# Patient Record
Sex: FEMALE | Race: BLACK OR AFRICAN AMERICAN
[De-identification: names, ages, dates, MRNs, and addresses within clinical notes are randomized per-mention and may not be internally consistent; named-entity substitution may affect disease eponyms.]

---

## 2017-06-19 NOTE — ER DOCUMENT REPORT
HPI





- HPI


Patient complains to provider of: low back pain and medial left ankle pain


Onset: Other - saturday


Onset/Duration: Sudden


Quality of pain: Achy


Pain Level: 4


Context: 


21-year-old female insulin-dependent diabetic slipped on some stairs on her 

back and hitting her medial left ankle on some metal on the way down.  She is 

complaining of pain to her low back and medial ankle and wanting x-rays.  

Noticed when talking with the patient that she smelled ketotic.  She states she 

was discharged from [facility] on Saturday for critical DKA.  She felt 

well when she was discharged Saturday and she still feels well today.  Her 

glucose was 220 this morning which she covered with insulin.  She has not eaten 

today.  She is drinking water.





- REPRODUCTIVE


Reproductive: DENIES: Pregnant:





- DERM


Skin Color: Normal





Past Medical History





- General


Information source: Patient





- Social History


Smoking Status: Unknown if Ever Smoked


Frequency of alcohol use: None


Drug Abuse: None


Lives with: Parents


Family History: Reviewed & Not Pertinent


Patient has suicidal ideation: No


Patient has homicidal ideation: No


Endocrine Medical History: Reports: Hx Diabetes Mellitus Type 1


Renal/ Medical History: Denies: Hx Peritoneal Dialysis


Past Surgical History: Reports: Hx Cholecystectomy





- Immunizations


Hx Diphtheria, Pertussis, Tetanus Vaccination: Yes





Vertical Provider Document





- CONSTITUTIONAL


Agree With Documented VS: Yes





- INFECTION CONTROL


TRAVEL OUTSIDE OF THE [place] IN LAST 30 DAYS: No





- HEENT


HEENT: Atraumatic, Normocephalic


Notes: 


smells ketotic, looks dry





- RESPIRATORY


O2 Sat by Pulse Oximetry: 99





- NEURO


Level of Consciousness: Awake, Alert, Appropriate


Motor/Sensory: No Motor Deficit, No Sensory Deficit





- DERM


Notes: 


nurse was able to remove the gray discoloration with body wipe. Pt was asking 

for something for pain stronger than tylenol , I checked her in the [place] 

controlled substance database and she is not listed for any narcotic 

prescriptions





Course





- Re-evaluation


Re-evalutation: 


06/19/17 10:24


X-rays are negative.  Explained the risks to the patient earlier about not 

addressing her acidotic odor to her breath.  She did not want to be evaluated 

for her diabetes or acidotic odor.  She states since then she feels the same as 

she did when she was discharged from the [facility] on Saturday that she 

will continue to drink fluids and check her glucoses today.  Signed the refusal 

of treatment form and Dr. Blank was okay with that.














- Vital Signs


Vital signs: 


 











Temp Pulse Resp BP Pulse Ox


 


 98.1 F   116 H  14   122/89 H  99 


 


 06/19/17 07:25  06/19/17 07:25  06/19/17 07:25  06/19/17 07:25  06/19/17 07:25














Procedures





- Immobilization


  ** Left Ankle


Time completed: 10:35


Pre-Proc Neuro Vasc Exam: Normal


Immobilizer type: Ace wrap


Performed by: PCT


Post-Proc Neuro Vasc Exam: Normal


Alignment checked and good: Yes





Discharge





- Discharge


Clinical Impression: 


 refusal of evaluation of diabetes





Fall


Qualifiers:


 Encounter type: initial encounter Qualified Code(s): W19.XXXA - Unspecified 

fall, initial encounter





Low back pain


Qualifiers:


 Chronicity: acute Back pain laterality: bilateral Sciatica presence: without 

sciatica Qualified Code(s): M54.5 - Low back pain





Left ankle pain


Qualifiers:


 Chronicity: acute Qualified Code(s): M25.572 - Pain in left ankle and joints 

of left foot





Condition: Good


Disposition: HOME, SELF-CARE


Instructions:  Ace Wrap ([facility]), Sprained Ankle ([facility]), Low Back Pain ([facility])


Additional Instructions: 


return to the er if you change your mind about checking your diabetes status


drink  water all day long, to flush your system


Ace wrap for comfort





Please complete the patient satisfaction survey if you get one, and return it.. 

If you do not receive a survey,  then you can go to the [facility] website, [email or web address] 

and place your comments about your very good care. Thank you very much. It was 

a pleasure being your medical provider today.


Prescriptions: 


Hydrocodone Bit/Acetaminophen [Hydrocodon-Acetaminophen 5-325] 1 each PO Q4HP 

PRN #7 tablet


 PRN Reason:

## 2017-06-19 NOTE — RADIOLOGY REPORT (SQ)
EXAM DESCRIPTION:  L SPINE WHOLE



COMPLETED DATE/TIME:  6/19/2017 10:06 am



REASON FOR STUDY:  fall, bruis, pain



COMPARISON:  None.



NUMBER OF VIEWS:  Five views including obliques.



TECHNIQUE:  AP, lateral, oblique, and sacral radiographic images acquired of the lumbar spine.



LIMITATIONS:  None.



FINDINGS:  MINERALIZATION: Normal.

SEGMENTATION: Normal.  No transitional anatomy.

ALIGNMENT: Normal.

VERTEBRAE: Maintained height.  No fracture or worrisome bone lesion.

DISCS: Preserved height.  No significant osteophytes or end plate irregularity.

POSTERIOR ELEMENTS: Pedicles and facets are intact.  No pars defect or posterior arch defects.

HARDWARE: None in the spine.

PARASPINAL SOFT TISSUES: Normal.

PELVIS: Intact as visualized. No fractures or worrisome bone lesions. SI joints intact.

OTHER: No other significant finding.



IMPRESSION:  NORMAL 5 VIEW LUMBAR SPINE.



TECHNICAL DOCUMENTATION:  JOB ID:  [id number]

 2011 [employer]- All Rights Reserved

## 2017-06-19 NOTE — RADIOLOGY REPORT (SQ)
EXAM DESCRIPTION:  ANKLE LEFT COMPLETE



COMPLETED DATE/TIME:  6/19/2017 10:06 am



REASON FOR STUDY:  fall, bruis, pain



COMPARISON:  11/5/2016.



NUMBER OF VIEWS:  Three views.



TECHNIQUE:  AP, lateral, and oblique radiographic images acquired of the left ankle.



LIMITATIONS:  None.



FINDINGS:  MINERALIZATION: Normal.

BONES: No acute fracture or dislocation.  No worrisome bone lesions.

JOINTS: No effusions.

SOFT TISSUES: No soft tissue swelling. No foreign body.

OTHER: No other significant finding.



IMPRESSION:  NEGATIVE STUDY OF THE LEFT ANKLE. NO RADIOGRAPHIC EVIDENCE OF ACUTE INJURY.



TECHNICAL DOCUMENTATION:  JOB ID:  [id number]

 2011 [employer]- All Rights Reserved

## 2017-06-22 NOTE — ER DOCUMENT REPORT
ED Medical Screen (RME)





- General


Chief Complaint: Fall Injury


Stated Complaint: SHORTNESS OF BREATH


Time Seen by Provider: 06/22/17 18:04


Notes: 


Patient is a known insulin-dependent diabetic who is here complaining of 

feeling short of breath for the past 2 days.  Her blood sugars at home have 

been running 300 or more.  She was seen here about 3 or 4 days ago with ankle 

and back pain after a fall.


TRAVEL OUTSIDE OF THE [place] IN LAST 30 DAYS: No





- Related Data


Allergies/Adverse Reactions: 


 





adhesive tape [Adhesive Tape] Allergy (Verified 06/19/17 07:25)


 











Past Medical History


Endocrine Medical History: Reports: Hx Diabetes Mellitus Type 1


Renal/ Medical History: Denies: Hx Peritoneal Dialysis


Past Surgical History: Reports: Hx Cholecystectomy





- Immunizations


Hx Diphtheria, Pertussis, Tetanus Vaccination: Yes





Physical Exam





- Vital signs


Vitals: 





 











Temp Pulse Resp BP Pulse Ox


 


 98.2 F   134 H  20   129/93 H  100 


 


 06/22/17 17:50  06/22/17 17:50  06/22/17 17:50  06/22/17 17:50  06/22/17 17:50














Course





- Vital Signs


Vital signs: 





 











Temp Pulse Resp BP Pulse Ox


 


 98.2 F   134 H  20   129/93 H  100 


 


 06/22/17 17:50  06/22/17 17:50  06/22/17 17:50  06/22/17 17:50  06/22/17 17:50

## 2017-06-22 NOTE — ER DOCUMENT REPORT
ED General





- General


Chief Complaint: Fall Injury


Stated Complaint: SHORTNESS OF BREATH


Time Seen by Provider: 06/22/17 18:04


Mode of Arrival: Ambulatory


Information source: Patient


Notes: 


21-year-old diabetic female presents with complaints of left ankle pain low 

back pain after fall a few days prior.  Patient presents with a blood sugar in 

the 300s denies any fevers or chills admits to mild nausea.  Patient last in 

DKA over one year ago


TRAVEL OUTSIDE OF THE [place] IN LAST 30 DAYS: No





- HPI


Onset: Just prior to arrival


Onset/Duration: Sudden


Quality of pain: Achy


Severity: Mild


Pain Level: 1


Associated symptoms: Weakness


Exacerbated by: Denies


Relieved by: Denies


Similar symptoms previously: Yes


Recently seen / treated by doctor: Yes





- Related Data


Allergies/Adverse Reactions: 


 





adhesive tape [Adhesive Tape] Allergy (Verified 06/19/17 07:25)


 











Past Medical History





- Social History


Smoking Status: Never Smoker


Cigarette use (# per day): No


Chew tobacco use (# tins/day): No


Smoking Education Provided: No


Family History: Reviewed & Not Pertinent


Patient has suicidal ideation: No


Patient has homicidal ideation: No


Endocrine Medical History: Reports: Hx Diabetes Mellitus Type 1


Renal/ Medical History: Denies: Hx Peritoneal Dialysis


Past Surgical History: Reports: Hx Cholecystectomy





- Immunizations


Hx Diphtheria, Pertussis, Tetanus Vaccination: Yes





Review of Systems





- Review of Systems


Notes: 


REVIEW OF SYSTEMS:


CONSTITUTIONAL :  Denies fever,  chills, or sweats.  Denies recent illness.


EENT:   Denies eye, ear, throat, or mouth pain or symptoms.  Denies nasal or 

sinus congestion or discharge.  Denies throat, tongue, or mouth swelling or 

difficulty swallowing.


CARDIOVASCULAR:  Denies chest pain.  Denies palpitations or racing or irregular 

heart beat.  Denies ankle edema.


RESPIRATORY:  Denies cough, cold, or chest congestion.  Denies shortness of 

breath, difficulty breathing, or wheezing.


GASTROINTESTINAL: Admits to nausea


GENITOURINARY:  Denies difficulty urinating, painful urination, burning, 

frequency, blood in urine, or discharge.


FEMALE  GENITOURINARY:  Denies vaginal bleeding, heavy or abnormal periods, 

irregular periods.  Denies vaginal discharge or odor. 


MUSCULOSKELETAL: Admits to back pain ankle pain


SKIN:   Denies rash, lesions or sores.


HEMATOLOGIC :   Denies easy bruising or bleeding.


LYMPHATIC:  Denies swollen, enlarged glands.


NEUROLOGICAL:  Denies confusion or altered mental status.  Denies passing out 

or loss of consciousness.  Denies dizziness or lightheadedness.  Denies 

headache.  Denies weakness or paralysis or loss of use of either side.  Denies 

problems with gait or speech.  Denies sensory loss, numbness, or tingling.  

Denies seizures.


PSYCHIATRIC:  Denies anxiety or stress.  Denies depression, suicidal ideation, 

or homicidal ideation.





ALL OTHER SYSTEMS REVIEWED AND NEGATIVE.











PHYSICAL EXAMINATION:





GENERAL: ill appearing female , drowsy





HEAD: Atraumatic, normocephalic.





EYES: Pupils equal round and reactive to light, extraocular movements intact, 

conjunctiva are normal.





ENT: Nares patent, oropharynx clear without exudates.  Moist mucous membranes.





NECK: Normal range of motion, supple without lymphadenopathy





LUNGS: Breath sounds clear to auscultation bilaterally and equal.  No wheezes 

rales or rhonchi.





HEART: Regular rate and rhythm without murmurs





ABDOMEN: Soft, nontender, nondistended abdomen.  No guarding, no rebound.  No 

masses appreciated.





Female : deferred





Musculoskeletal: Normal range of motion, no pitting or edema.  No cyanosis.





NEUROLOGICAL: drowsy but gcs 15





PSYCH: Normal mood, normal affect.





SKIN: Warm, Dry, normal turgor, no rashes or lesions noted.

















Dictation was performed using Dragon voice recognition software





Physical Exam





- Vital signs


Vitals: 


 











Temp Pulse Resp BP Pulse Ox


 


 98.2 F   134 H  20   129/93 H  100 


 


 06/22/17 17:50  06/22/17 17:50  06/22/17 17:50  06/22/17 17:50  06/22/17 17:50














Course





- Re-evaluation


Re-evalutation: 





06/22/17 23:48


Patient initially tachycardic tachypneic hyperventilating, glucose was slightly 

elevated however with further evaluation it does appear the patient is in 

diabetic ketoacidosis.  I will admit the patient to the ICU has started drip of 

insulin at 8 units as well as D5 half-normal saline given the anion gap





- Vital Signs


Vital signs: 


 











Temp Pulse Resp BP Pulse Ox


 


 98.2 F   134 H  20   129/93 H  100 


 


 06/22/17 17:50  06/22/17 17:50  06/22/17 17:50  06/22/17 17:50  06/22/17 17:50














- Laboratory


Result Diagrams: 


 06/22/17 18:24





 06/22/17 19:26


Laboratory results interpreted by me: 


 











  06/22/17 06/22/17 06/22/17





  18:24 18:24 19:26


 


MCHC  31.4 L  


 


RDW  16.0 H  


 


VBG pH   


 


VBG pCO2   


 


VBG HCO3   


 


Sodium    135.9 L


 


Potassium    5.1 H


 


Carbon Dioxide    5 L*


 


Anion Gap    27 H


 


Glucose    316 H


 


Direct Bilirubin    0.5 H


 


Total Protein    8.5 H


 


Urine Glucose (UA)   >=500 H 


 


Urine Ketones   80 H 














  06/22/17





  21:54


 


MCHC 


 


RDW 


 


VBG pH  7.12 L*


 


VBG pCO2  22.6 L


 


VBG HCO3  7.2 L


 


Sodium 


 


Potassium 


 


Carbon Dioxide 


 


Anion Gap 


 


Glucose 


 


Direct Bilirubin 


 


Total Protein 


 


Urine Glucose (UA) 


 


Urine Ketones 














Critical Care Note





- Critical Care Note


Total time excluding time spent on procedures (mins): 40


Comments: 


40 minutes of critical care time spent in direct contact evaluating and 

reevaluating the patient, treating symptoms, reviewing labs and studies and 

speaking with family  and consultants excluding any procedures





Discharge





- Discharge


Clinical Impression: 


DKA (diabetic ketoacidoses)


Qualifiers:


 Diabetes mellitus type: type 1 Diabetes mellitus complication detail: without 

coma Qualified Code(s): E10.10 - Type 1 diabetes mellitus with ketoacidosis 

without coma





Fall


Qualifiers:


 Encounter type: initial encounter Qualified Code(s): W19.XXXA - Unspecified 

fall, initial encounter





Condition: Fair


Disposition: ADMITTED AS INPATIENT


Admitting Provider: Hospitalist


Unit Admitted: ICU

## 2017-06-23 NOTE — PDOC H&P
History of Present Illness


Admission Date/PCP: 


  06/22/17 22:34





  





Patient complains of: Abdominal pain and nausea


History of Present Illness: 


AMY PACHECO is a 21 year old female with a past medical history of 

Hashimoto's thyroiditis, postpartum cardiomyopathy, Depression, Anxiety and 

insulin dependent diabetes with recurrent DKA.  She comes to the emergency room 

after uncontrolled hyperglycemia and fall without subsequent injury where she 

is found to have severe DKA with a bicarb of 5.  She denies headache sore 

throat shortness of breath or cough, admits to polyuria and polydipsia and 

blood sugars between 200 and 400.  She admits several episodes in the recent 

past requiring hospitalization at outside facilities.  She lives in [place] [facility] and is in [place] visiting her boyfriend.








Past Medical History


Medical History: Other - Noncompliance


Cardiac Medical History: Reports: Heart Murmur, Other - Postpartum 

cardiomyopathy, borderline QT interval prolongation


Endocrine Medical History: Reports: Diabetes Mellitus Type 1


Psychiatric Medical History: Reports: Depression, General Anxiety Disorder





Past Surgical History


Past Surgical History: Reports: Cholecystectomy





Social History


Information Source: Patient, [facility] Records


Lives with: Family


Smoking Status: Unknown if Ever Smoked


Frequency of Alcohol Use: None


Hx Recreational Drug Use: No


Drugs: None


Hx Prescription Drug Abuse: No





- Advance Directive


Resuscitation Status: Full Code





Family History


Family History: DM, Hypertension


Parental Family History Reviewed: Yes


Children Family History Reviewed: Yes


Sibling(s) Family History Reviewed.: Yes





Medication/Allergy


Home Medications: 








Insulin Lispro [Humalog Insulin (Lispro) 100 unit/mL] 0 unit SUBCUT .SLD SCALE 

05/01/16 


Insulin Glargine,Hum.rec.anlog [Lantus Insulin 100 Unit/mL] 32 unit SUBCUT 

DAILY  insuln.pen 05/02/16 


Hydrocodone/Acetaminophen [Norco 5-325 mg Tablet] 1 tab PO Q6HP PRN #25 tablet 

05/18/16 


Sulfamethoxazole/Trimethoprim [Bactrim Ds Tablet] 1 each PO BID #14 tablet 05/18 /16 


Diphenhydramine HCl [Benadryl 50 mg Capsule] 50 mg PO Q6 #20 capsule 06/07/16 


Promethazine HCl [Phenergan 25 mg Tablet] 25 - 50 mg PO ASDIR PRN #12 tablet 06/ 07/16 


Sulfamethoxazole/Trimethoprim [Bactrim Ds Tablet] 1 each PO BID #10 tablet 06/11 /16 


Hydrocodone/Acetaminophen [Norco 5-325 Tablet] 1 each PO Q6 PRN #15 tablet 11/05 /16 


Hydrocodone Bit/Acetaminophen [Hydrocodon-Acetaminophen 5-325] 1 each PO Q4HP 

PRN #7 tablet 06/19/17 








Allergies/Adverse Reactions: 


 





adhesive tape [Adhesive Tape] Allergy (Verified 06/19/17 07:25)


 











Review of Systems


Constitutional: ABSENT: chills, fever(s), headache(s), weight gain, weight loss


Eyes: ABSENT: visual disturbances


Ears: ABSENT: hearing changes


Cardiovascular: ABSENT: chest pain, dyspnea on exertion, edema, orthropnea, 

palpitations


Respiratory: ABSENT: cough, hemoptysis


Gastrointestinal: ABSENT: abdominal pain, constipation, diarrhea, hematemesis, 

hematochezia, nausea, vomiting


Genitourinary: ABSENT: dysuria, hematuria


Musculoskeletal: ABSENT: joint swelling


Integumentary: ABSENT: rash, wounds


Neurological: ABSENT: abnormal gait, abnormal speech, confusion, dizziness, 

focal weakness, syncope


Psychiatric: ABSENT: anxiety, depression, homidical ideation, suicidal ideation


Endocrine: ABSENT: cold intolerance, heat intolerance, polydipsia, polyuria


Hematologic/Lymphatic: ABSENT: easy bleeding, easy bruising





Physical Exam


Vital Signs: 


 











Temp Pulse Resp BP Pulse Ox


 


 98 F   122 H  15   110/86 H  98 


 


 06/23/17 01:58  06/23/17 01:58  06/23/17 02:00  06/23/17 01:54  06/23/17 02:00








 Intake & Output











 06/21/17 06/22/17 06/23/17





 11:59 11:59 11:59


 


Weight   53 kg











General appearance: PRESENT: disheveled, mild distress, thin


Head exam: PRESENT: atraumatic, normocephalic


Eye exam: PRESENT: conjunctiva pink, EOMI, PERRLA.  ABSENT: scleral icterus


Ear exam: PRESENT: normal external ear exam


Mouth exam: PRESENT: dry mucosa


Neck exam: ABSENT: carotid bruit, JVD, lymphadenopathy, thyromegaly


Respiratory exam: PRESENT: clear to auscultation jessika.  ABSENT: rales, rhonchi, 

wheezes


Cardiovascular exam: PRESENT: gallop, RRR, systolic murmur, tachycardia.  ABSENT

: diastolic murmur, rubs


Pulses: PRESENT: normal dorsalis pedis pul


GI/Abdominal exam: PRESENT: normal bowel sounds, soft.  ABSENT: distended, 

guarding, mass, organolmegaly, rebound, tenderness


Rectal exam: PRESENT: deferred


Extremities exam: PRESENT: full ROM.  ABSENT: calf tenderness, clubbing, pedal 

edema


Neurological exam: PRESENT: alert, awake, oriented to person, oriented to place

, oriented to time, oriented to situation, CN II-XII grossly intact.  ABSENT: 

motor sensory deficit


Psychiatric exam: PRESENT: anxious, depressed, manic, unusual affect.  ABSENT: 

suicidal ideation


Focused psych exam: PRESENT: internal stimuli, restlessness


Skin exam: PRESENT: dry, intact, warm.  ABSENT: cyanosis, rash





Results


Laboratory Results: 


 





 06/23/17 03:23 





 06/23/17 03:23 





 











  06/22/17 06/23/17 06/23/17





  23:13 03:23 03:23


 


WBC    5.3


 


RBC    3.81


 


Hgb    10.9 L


 


Hct    33.8 L


 


MCV    89


 


MCH    28.6


 


MCHC    32.2


 


RDW    15.6 H


 


Plt Count    340


 


Seg Neutrophils %    45.9


 


Lymphocytes %    39.5


 


Monocytes %    12.8


 


Eosinophils %    1.3


 


Basophils %    0.5


 


Absolute Neutrophils    2.4


 


Absolute Lymphocytes    2.1


 


Absolute Monocytes    0.7


 


Absolute Eosinophils    0.1


 


Absolute Basophils    0.0


 


Sodium  137.0  137.7 


 


Potassium  5.1 H  3.0 L* D 


 


Chloride  107  112 H 


 


Carbon Dioxide  6 L*  14 L 


 


Anion Gap  24 H  12 


 


BUN  7  6 L 


 


Creatinine  0.52  0.40 L 


 


Est GFR ( Amer)  > 60  > 60 


 


Est GFR (Non-Af Amer)  > 60  > 60 


 


Glucose  264 H  114 H 


 


Calcium  8.9  8.7 














Assessment & Plan





- Diagnosis


(1) DKA (diabetic ketoacidoses)


Qualifiers: 


     Diabetes mellitus type: type 1     Diabetes mellitus complication detail: 

without coma     Qualified Code(s): E10.10 - Type 1 diabetes mellitus with 

ketoacidosis without coma  


Is this a current diagnosis for this admission?: YesPlan: 


Diabetic ketoacidosis  patient has had some degree of polyuria polydipsia with 

nausea and uncontrolled hyperglycemia with supporting labs.  Patient will 

receive IV fluids IV insulin serial chemistries every 6 hours for evaluation 

for electrolyte repletion.  Continued evaluation for underlying cause if not 

found Patient will require diabetic education and consideration of mental 

health evaluation.








(2) Noncompliance


Is this a current diagnosis for this admission?: YesPlan: 


Mental health consult








(3) Anxiety


Is this a current diagnosis for this admission?: YesPlan: 


Benzodiazepine as needed tox screen negative








(4) Depression





Is this a current diagnosis for this admission?: YesPlan: 


Trial of Abilify initiated











- Time


Time Spent: 50 to 70 Minutes





- Inpatient Certification


Medical Necessity: Need Close Monitoring Due to Risk of Patient Decompensation

## 2017-06-23 NOTE — EKG REPORT
SEVERITY:- BORDERLINE ECG -

SINUS TACHYCARDIA

BORDERLINE LEFT AXIS DEVIATION

BORDERLINE PROLONGED QT INTERVAL

:

Confirmed by: Estrella Amanda MD 23-Jun-2017 04:00:54

## 2017-06-23 NOTE — PDOC PROGRESS REPORT
Subjective


Progress Note for:: 06/23/17


Subjective:: 


Patient complains of wanting to eat and of chronic ankle and back discomfort.


Denies chest pain, breath, fever, chills, dysuria.





Physical Exam


Vital Signs: 


 











Temp Pulse Resp BP Pulse Ox


 


 98.3 F   111 H  15   108/78   100 


 


 06/23/17 12:00  06/23/17 14:00  06/23/17 12:00  06/23/17 12:00  06/23/17 13:00








 Intake & Output











 06/22/17 06/23/17 06/24/17





 06:59 06:59 06:59


 


Intake Total  836 1737


 


Output Total   250


 


Balance  836 1487


 


Weight  49.1 kg 











Exam: 


GENERAL: No acute distress


HEENT: Conjunctiva clear, nonicteric, moist mucous membranes, no JVD, midline 

trachea


RESPIRATORY: Clear to auscultation bilaterally, no wheezes, no rhonchi


CARDIAC: Regular rate and rhythm, no murmurs/gallops/rubs


ABDOMEN: Soft, nondistended, nontender, positive bowel sounds, no rebound, no 

guarding


EXTREMETIES: c/c/e


NEUROLOGIC: Alert, oriented to person/place/time, CN's grossly intact,  no 

focal deficits


SKIN: No rash, lesion


PSYCH: Normal mood, normal affect





Results


Laboratory Results: 


 





 06/23/17 03:23 





 06/23/17 16:20 





 











  06/22/17 06/23/17 06/23/17





  23:13 03:23 03:23


 


WBC    5.3


 


RBC    3.81


 


Hgb    10.9 L


 


Hct    33.8 L


 


MCV    89


 


MCH    28.6


 


MCHC    32.2


 


RDW    15.6 H


 


Plt Count    340


 


Seg Neutrophils %    45.9


 


Lymphocytes %    39.5


 


Monocytes %    12.8


 


Eosinophils %    1.3


 


Basophils %    0.5


 


Absolute Neutrophils    2.4


 


Absolute Lymphocytes    2.1


 


Absolute Monocytes    0.7


 


Absolute Eosinophils    0.1


 


Absolute Basophils    0.0


 


Sodium  137.0  137.7 


 


Potassium  5.1 H  3.0 L* D 


 


Chloride  107  112 H 


 


Carbon Dioxide  6 L*  14 L 


 


Anion Gap  24 H  12 


 


BUN  7  6 L 


 


Creatinine  0.52  0.40 L 


 


Est GFR ( Amer)  > 60  > 60 


 


Est GFR (Non-Af Amer)  > 60  > 60 


 


Glucose  264 H  114 H 


 


Calcium  8.9  8.7 


 


Magnesium   














  06/23/17 06/23/17 06/23/17





  07:30 07:30 11:27


 


WBC   


 


RBC   


 


Hgb   


 


Hct   


 


MCV   


 


MCH   


 


MCHC   


 


RDW   


 


Plt Count   


 


Seg Neutrophils %   


 


Lymphocytes %   


 


Monocytes %   


 


Eosinophils %   


 


Basophils %   


 


Absolute Neutrophils   


 


Absolute Lymphocytes   


 


Absolute Monocytes   


 


Absolute Eosinophils   


 


Absolute Basophils   


 


Sodium  136.4 L   135.8 L


 


Potassium  3.6   3.7


 


Chloride  112 H   110 H


 


Carbon Dioxide  15 L   16 L


 


Anion Gap  9   10


 


BUN  6 L   6 L


 


Creatinine  0.34 L   0.34 L


 


Est GFR ( Amer)  > 60   > 60


 


Est GFR (Non-Af Amer)  > 60   > 60


 


Glucose  55 L   81


 


Calcium  8.8   8.9


 


Magnesium   1.4 L 














  06/23/17





  16:20


 


WBC 


 


RBC 


 


Hgb 


 


Hct 


 


MCV 


 


MCH 


 


MCHC 


 


RDW 


 


Plt Count 


 


Seg Neutrophils % 


 


Lymphocytes % 


 


Monocytes % 


 


Eosinophils % 


 


Basophils % 


 


Absolute Neutrophils 


 


Absolute Lymphocytes 


 


Absolute Monocytes 


 


Absolute Eosinophils 


 


Absolute Basophils 


 


Sodium  131.9 L


 


Potassium  3.8


 


Chloride  107


 


Carbon Dioxide  16 L


 


Anion Gap  9


 


BUN  6 L


 


Creatinine  0.49 L


 


Est GFR ( Amer)  > 60


 


Est GFR (Non-Af Amer)  > 60


 


Glucose  314 H


 


Calcium  8.5


 


Magnesium 














Assessment & Plan





- Diagnosis


(1) DKA (diabetic ketoacidoses)


Qualifiers: 


     Diabetes mellitus type: type 1     Diabetes mellitus complication detail: 

without coma     Qualified Code(s): E10.10 - Type 1 diabetes mellitus with 

ketoacidosis without coma  


Is this a current diagnosis for this admission?: YesPlan: 


Continue per DKA protocol.  Will be able to transition patient onto normal 

Lantus.  Secondary to noncompliance.








(2) Left ankle pain


Qualifiers: 


     Chronicity: unspecified        Qualified Code(s): M25.572 - Pain in left 

ankle and joints of left foot  


Is this a current diagnosis for this admission?: YesPlan: 


Motrin and warm pack








(3) Low back pain


Qualifiers: 


     Chronicity: unspecified     Back pain laterality: bilateral     Sciatica 

presence: without sciatica        Qualified Code(s): M54.5 - Low back pain  


Is this a current diagnosis for this admission?: Yes





(4) Noncompliance


Is this a current diagnosis for this admission?: Yes








- Time


Time Spent with patient: 25-34 minutes


Medications reviewed and adjusted accordingly: Yes


Anticipated discharge: Home


Within: within 48 hours

## 2017-06-26 NOTE — PDOC DISCHARGE SUMMARY
General





- Admit/Disc Date/PCP


Admission Date/Primary Care Provider: 


  06/22/17 22:34





  





Discharge Date: 06/23/17





- Discharge Diagnosis


(1) Left against medical advice


Is this a current diagnosis for this admission?: Yes





(2) DKA (diabetic ketoacidoses)


Is this a current diagnosis for this admission?: Yes





(3) Left ankle pain


Is this a current diagnosis for this admission?: Yes





(4) Low back pain


Is this a current diagnosis for this admission?: Yes





(5) Noncompliance


Is this a current diagnosis for this admission?: Yes








- Additional Information


Resuscitation Status: Full Code


Home Medications: 








Fluoxetine HCl [Prozac 20 mg Capsule] 20 mg PO DAILY 06/23/17 


Insulin Glargine,Hum.rec.anlog [Lantus Insulin 100 Unit/mL] 20 units SUBCUT 

DAILY 06/23/17 











History of Present Illness


History of Present Illness: 


AMY PACHECO is a 21 year old female  with a past medical history of 

Hashimoto's thyroiditis, postpartum cardiomyopathy, Depression, Anxiety and 

insulin dependent diabetes with recurrent DKA.  She comes to the emergency room 

after uncontrolled hyperglycemia and fall without subsequent injury where she 

is found to have severe DKA with a bicarb of 5.  She denies headache sore 

throat shortness of breath or cough, admits to polyuria and polydipsia and 

blood sugars between 200 and 400.  She admits several episodes in the recent 

past requiring hospitalization at outside facilities.  She lives in [facility] and is in [place] visiting her boyfriend.








Hospital Course


Hospital Course: 


Patient was quickly weaned off of insulin drip per protocol.  Still in the 

process of being rehydrated although her gap was closed and her CO2 was 

improving.  Patient requested narcotics for her ankle pain, which was denied.  

Patient had an x-ray which revealed no fracture.  Patient had no focal findings 

on physical examination of her ankle.  Patient was offered Motrin and Tylenol.  

Reported to nursing staff that she left AGAINST MEDICAL ADVICE because she did 

not receive narcotic pain medication.  No prescriptions were given and no 

physical exam was performed.





Physical Exam


Vital Signs: 


 











Temp Pulse Resp BP Pulse Ox


 


 97.9 F   112 H  18   120/72   99 


 


 06/23/17 20:12  06/23/17 20:42  06/23/17 20:42  06/23/17 20:12  06/23/17 20:42








 Intake & Output











 06/23/17 06/24/17 06/25/17





 06:59 06:59 06:59


 


Intake Total 836 1737 


 


Output Total  250 


 


Balance 836 1487 


 


Weight 49.1 kg  














Results


Laboratory Results: 


 





 06/23/17 03:23 





 06/23/17 16:20 











Qualifiers


**PATEINT BEING DISCHARGED WITH ANY OF THE FOLLOWING DIAGNOSIS?: No





Plan


Time Spent: Less than 30 Minutes

## 2020-02-22 ENCOUNTER — HOSPITAL ENCOUNTER (EMERGENCY)
Dept: HOSPITAL 62 - ER | Age: 25
Discharge: HOME | End: 2020-02-22
Payer: MEDICARE

## 2020-02-22 VITALS — DIASTOLIC BLOOD PRESSURE: 72 MMHG | SYSTOLIC BLOOD PRESSURE: 116 MMHG

## 2020-02-22 DIAGNOSIS — R53.1: ICD-10-CM

## 2020-02-22 DIAGNOSIS — R09.81: ICD-10-CM

## 2020-02-22 DIAGNOSIS — R11.2: ICD-10-CM

## 2020-02-22 DIAGNOSIS — R09.89: ICD-10-CM

## 2020-02-22 DIAGNOSIS — Z53.21: Primary | ICD-10-CM

## 2020-02-22 DIAGNOSIS — R05: ICD-10-CM

## 2020-02-22 DIAGNOSIS — Z79.4: ICD-10-CM

## 2020-02-22 DIAGNOSIS — E10.9: ICD-10-CM

## 2020-02-22 DIAGNOSIS — J02.9: ICD-10-CM

## 2020-02-22 DIAGNOSIS — Z88.8: ICD-10-CM

## 2020-02-22 LAB
A TYPE INFLUENZA AG: NEGATIVE
ADD MANUAL DIFF: NO
ALBUMIN SERPL-MCNC: 4.6 G/DL (ref 3.5–5)
ALP SERPL-CCNC: 105 U/L (ref 38–126)
ANION GAP SERPL CALC-SCNC: 14 MMOL/L (ref 5–19)
APPEARANCE UR: (no result)
APTT PPP: YELLOW S
AST SERPL-CCNC: 31 U/L (ref 14–36)
B INFLUENZA AG: NEGATIVE
BASOPHILS # BLD AUTO: 0.1 10^3/UL (ref 0–0.2)
BASOPHILS NFR BLD AUTO: 1.1 % (ref 0–2)
BILIRUB DIRECT SERPL-MCNC: 0.3 MG/DL (ref 0–0.4)
BILIRUB SERPL-MCNC: 0.5 MG/DL (ref 0.2–1.3)
BILIRUB UR QL STRIP: NEGATIVE
BUN SERPL-MCNC: 14 MG/DL (ref 7–20)
CALCIUM: 9.5 MG/DL (ref 8.4–10.2)
CHLORIDE SERPL-SCNC: 94 MMOL/L (ref 98–107)
CO2 SERPL-SCNC: 26 MMOL/L (ref 22–30)
EOSINOPHIL # BLD AUTO: 0.2 10^3/UL (ref 0–0.6)
EOSINOPHIL NFR BLD AUTO: 3.5 % (ref 0–6)
ERYTHROCYTE [DISTWIDTH] IN BLOOD BY AUTOMATED COUNT: 18.4 % (ref 11.5–14)
GLUCOSE SERPL-MCNC: 300 MG/DL (ref 75–110)
GLUCOSE UR STRIP-MCNC: >=500 MG/DL
HCT VFR BLD CALC: 31.2 % (ref 36–47)
HGB BLD-MCNC: 9.9 G/DL (ref 12–15.5)
KETONES UR STRIP-MCNC: NEGATIVE MG/DL
LYMPHOCYTES # BLD AUTO: 2 10^3/UL (ref 0.5–4.7)
LYMPHOCYTES NFR BLD AUTO: 42.6 % (ref 13–45)
MCH RBC QN AUTO: 23.5 PG (ref 27–33.4)
MCHC RBC AUTO-ENTMCNC: 31.7 G/DL (ref 32–36)
MCV RBC AUTO: 74 FL (ref 80–97)
MONOCYTES # BLD AUTO: 0.4 10^3/UL (ref 0.1–1.4)
MONOCYTES NFR BLD AUTO: 8.8 % (ref 3–13)
NEUTROPHILS # BLD AUTO: 2.1 10^3/UL (ref 1.7–8.2)
NEUTS SEG NFR BLD AUTO: 44 % (ref 42–78)
PH UR STRIP: 6 [PH] (ref 5–9)
PLATELET # BLD: 467 10^3/UL (ref 150–450)
POTASSIUM SERPL-SCNC: 4.5 MMOL/L (ref 3.6–5)
PROT SERPL-MCNC: 9.1 G/DL (ref 6.3–8.2)
PROT UR STRIP-MCNC: NEGATIVE MG/DL
RBC # BLD AUTO: 4.2 10^6/UL (ref 3.72–5.28)
SP GR UR STRIP: 1.02
TOTAL CELLS COUNTED % (AUTO): 100 %
UROBILINOGEN UR-MCNC: NEGATIVE MG/DL (ref ?–2)
WBC # BLD AUTO: 4.7 10^3/UL (ref 4–10.5)

## 2020-02-22 PROCEDURE — 85025 COMPLETE CBC W/AUTO DIFF WBC: CPT

## 2020-02-22 PROCEDURE — 87804 INFLUENZA ASSAY W/OPTIC: CPT

## 2020-02-22 PROCEDURE — 36415 COLL VENOUS BLD VENIPUNCTURE: CPT

## 2020-02-22 PROCEDURE — 81025 URINE PREGNANCY TEST: CPT

## 2020-02-22 PROCEDURE — 96360 HYDRATION IV INFUSION INIT: CPT

## 2020-02-22 PROCEDURE — 87880 STREP A ASSAY W/OPTIC: CPT

## 2020-02-22 PROCEDURE — 80053 COMPREHEN METABOLIC PANEL: CPT

## 2020-02-22 PROCEDURE — 71046 X-RAY EXAM CHEST 2 VIEWS: CPT

## 2020-02-22 PROCEDURE — 96372 THER/PROPH/DIAG INJ SC/IM: CPT

## 2020-02-22 PROCEDURE — 87070 CULTURE OTHR SPECIMN AEROBIC: CPT

## 2020-02-22 PROCEDURE — 81001 URINALYSIS AUTO W/SCOPE: CPT

## 2020-02-22 PROCEDURE — 96361 HYDRATE IV INFUSION ADD-ON: CPT

## 2020-02-22 PROCEDURE — 83690 ASSAY OF LIPASE: CPT

## 2020-02-22 PROCEDURE — 99283 EMERGENCY DEPT VISIT LOW MDM: CPT

## 2020-02-22 RX ADMIN — SODIUM CHLORIDE PRN MLS/HR: 9 INJECTION, SOLUTION INTRAVENOUS at 19:40

## 2020-02-22 RX ADMIN — SODIUM CHLORIDE PRN MLS/HR: 9 INJECTION, SOLUTION INTRAVENOUS at 18:40

## 2020-02-22 NOTE — RADIOLOGY REPORT (SQ)
EXAM DESCRIPTION:  CHEST 2 VIEWS



COMPLETED DATE/TIME:  2/22/2020 5:54 pm



REASON FOR STUDY:  cough



COMPARISON:  None.



EXAM PARAMETERS:  NUMBER OF VIEWS: two views

TECHNIQUE: Digital Frontal and Lateral radiographic views of the chest acquired.

RADIATION DOSE: NA

LIMITATIONS: none



FINDINGS:  LUNGS AND PLEURA: No opacities, masses or pneumothorax. No pleural effusion.

MEDIASTINUM AND HILAR STRUCTURES: No masses or contour abnormalities.

HEART AND VASCULAR STRUCTURES: Heart normal size.  No evidence for failure.

BONES: No acute findings.

HARDWARE: None in the chest.

OTHER: No other significant finding.



IMPRESSION:  NO ACUTE RADIOGRAPHIC FINDING IN THE CHEST.



TECHNICAL DOCUMENTATION:  JOB ID:  [id number]

 2011 [email or web address]- All Rights Reserved



Reading location - IP/workstation name: [email or web address]

## 2020-02-22 NOTE — ER DOCUMENT REPORT
ED Medical Screen (RME)





- General


Chief Complaint: Sore Throat


Stated Complaint: SORE THROAT,COUGH,CONGESTION


Time Seen by Provider: 02/22/20 17:27


TRAVEL OUTSIDE OF THE [place] IN LAST 30 DAYS: No





- HPI


Notes: 





02/22/20 17:32


Patient is a 24-year-old female with a history of insulin-dependent diabetes who

presents complaining of mild generalized weakness, nasal congestion/discharge, 

dry cough, nausea/vomiting over the past week.  Pt does have chest soreness with

her cough.  Patient states that she is still urinating normally and having 

normal bowel movements.  Denies pregnancy.  Patient states she does feel 

dehydrated.  No fever or abdominal pain.





I have treated and performed a rapid initial assessment of this patient.  A 

comprehensive ED assessment and evaluation of the patient, analysis of test 

results and completion of medical decision making process will be conducted by 

additional ED providers.





PHYSICAL EXAMINATION:





GENERAL: Well-appearing, well-nourished and in no acute distress.  A&Ox4.  

Answers questions appropriately.


Lungs: Grossly CTAB.  No retractions.


Heart: RRR





- Related Data


Allergies/Adverse Reactions: 


                                        





adhesive tape [Adhesive Tape] Allergy (Verified 06/19/17 07:25)


   











Past Medical History





- Past Medical History


Cardiac Medical History: Reports: Hx Heart Murmur


Endocrine Medical History: Reports: Hx Diabetes Mellitus Type 1


Renal/ Medical History: Denies: Hx Peritoneal Dialysis


Psychiatric Medical History: Reports: Hx Depression


Past Surgical History: Reports: Hx Cholecystectomy





- Immunizations


Hx Diphtheria, Pertussis, Tetanus Vaccination: Yes





Physical Exam





- Vital signs


Vitals: 





                                        











Temp Pulse Resp BP Pulse Ox


 


 98.2 F   100   18   97/77 L  100 


 


 02/22/20 17:25  02/22/20 17:25  02/22/20 17:25  02/22/20 17:25  02/22/20 17:25














Course





- Vital Signs


Vital signs: 





                                        











Temp Pulse Resp BP Pulse Ox


 


 98.2 F   100   18   97/77 L  100 


 


 02/22/20 17:25  02/22/20 17:25  02/22/20 17:25  02/22/20 17:25  02/22/20 17:25

## 2020-02-22 NOTE — ER DOCUMENT REPORT
Entered by GINA DESHPANDE SCRIBE  02/22/20 2022 





Acting as scribe for:KRYSTLE SUAREZ MD





ED General





- General


Chief Complaint: Cough


Stated Complaint: SORE THROAT,COUGH,CONGESTION


Time Seen by Provider: 02/22/20 17:27


Mode of Arrival: Ambulatory


Information source: Patient


Notes: 





This 24 year old female patient with a history of IDDM presents to the ED today 

with complaints of generalized weakness, nasal discharge/congestion, dry cough, 

reproducible chest pain, nausea, and vomiting for the past x1 week. Patient 

states that her symptoms initially started with a nonproductive cough and that 

the chest pain is exacerbated by coughing. Patient reports that her "tooth broke

off in the back" and is also causing her pain. Patient states that her daughter 

was diagnosed with influenza today and that she has not received the flu vaccine

this flu season. Patient notes that the Zofran she received here provided no 

relief. Patient reports a past medical history of PNA x4 in the past, but denies

history of CVA, cardiac problems, or asthma. Patient denies fever, chills, 

abdominal pain, diarrhea, urinary symptoms, or sputum production.  


TRAVEL OUTSIDE OF THE [place] IN LAST 30 DAYS: No





- Related Data


Allergies/Adverse Reactions: 


                                        





adhesive tape [Adhesive Tape] Allergy (Verified 06/19/17 07:25)


   








Home Medications: Insulin, flexiril, belsomra





Past Medical History





- Social History


Smoking Status: Never Smoker


Cigarette use (# per day): No


Chew tobacco use (# tins/day): No


Smoking Education Provided: No


Lives with: Family


Family History: Reviewed & Not Pertinent, DM, Hypertension


Patient has suicidal ideation: No


Patient has homicidal ideation: No





- Past Medical History


Cardiac Medical History: Reports: Hx Heart Murmur


Endocrine Medical History: Reports: Hx Diabetes Mellitus Type 1


Psychiatric Medical History: Reports: Hx Depression


Past Surgical History: Reports: Hx Cholecystectomy





- Immunizations


Hx Diphtheria, Pertussis, Tetanus Vaccination: Yes





Review of Systems





- Review of Systems


Constitutional: See HPI.  denies: Chills, Fever


EENT: See HPI, Nose congestion, Nose discharge


Cardiovascular: See HPI, Chest pain - reproducibl


Respiratory: See HPI, Cough.  denies: Sputum


Gastrointestinal: See HPI, Nausea, Vomiting.  denies: Abdominal pain, Diarrhea


Genitourinary: No symptoms reported


Female Genitourinary: No symptoms reported


Musculoskeletal: No symptoms reported


Skin: No symptoms reported


Hematologic/Lymphatic: No symptoms reported


Neurological/Psychological: No symptoms reported


-: Yes All other systems reviewed and negative





Physical Exam





- Vital signs


Vitals: 


                                        











Temp Pulse Resp BP Pulse Ox


 


 98.2 F   135 H  18   97/77 L  100 


 


 02/22/20 17:22  02/22/20 17:22  02/22/20 17:22  02/22/20 17:22  02/22/20 17:22














- General


General appearance: Alert


In distress: None





- HEENT


Head: Normocephalic, Atraumatic


Eyes: Normal


Pupils: PERRL





- Respiratory


Respiratory status: No respiratory distress


Chest status: Nontender


Breath sounds: Normal


Chest palpation: Normal





- Cardiovascular


Rhythm: Regular


Heart sounds: Normal auscultation


Murmur: No





- Abdominal


Inspection: Normal


Distension: No distension


Bowel sounds: Normal


Tenderness: Nontender


Organomegaly: No organomegaly





- Back


Back: Normal, Nontender





- Extremities


General upper extremity: Normal inspection


General lower extremity: Normal inspection





- Neurological


Neuro grossly intact: Yes





- Psychological


Associated symptoms: Normal affect, Normal mood





- Skin


Skin Temperature: Warm


Skin Moisture: Dry


Skin Color: Normal





Course





- Re-evaluation


Re-evalutation: 





02/22/20 21:57


Patient resting comfortably not showing any signs of distress at this time.  

Discussed with patient her lab results and chest x-ray not showing any pneumonia

and her strep test and her influenza a and B both negative.  Patient does have a

urinary tract infection which I am going to put on Keflex antibiotic also ago 

and put her on prophylactic Tamiflu inasmuch as her daughter turned positive 

today onset influenza screening here in this department.  Will receive work note

for the days her daughter will be out of school .





- Vital Signs


Vital signs: 


                                        











Temp Pulse Resp BP Pulse Ox


 


 97.9 F   82   18   111/73   94 


 


 02/22/20 19:55  02/22/20 19:55  02/22/20 19:55  02/22/20 19:55  02/22/20 19:55














- Laboratory


Result Diagrams: 


                                 02/22/20 18:30





                                 02/22/20 18:30


Laboratory results interpreted by me: 


                                        











  02/22/20 02/22/20 02/22/20





  17:52 18:30 18:30


 


Hgb   9.9 L 


 


Hct   31.2 L 


 


MCV   74 L 


 


MCH   23.5 L 


 


MCHC   31.7 L 


 


RDW   18.4 H 


 


Plt Count   467 H 


 


Sodium    133.9 L


 


Chloride    94 L


 


Creatinine    0.48 L


 


Glucose    300 H


 


Total Protein    9.1 H


 


Lipase    14.4 L


 


Urine Glucose (UA)  >=500 H  


 


Urine Nitrite (Reflex)  POSITIVE H  


 


Leukocyte Esterase Rfl  MODERATE H  


 


Urine Ascorbic Acid  40 H  














- Diagnostic Test


Radiology reviewed: Image reviewed, Reports reviewed


Radiology results interpreted by me: 





02/22/20 21:56


Chest x-ray no acute process.  No infiltrate





Discharge





- Discharge


Clinical Impression: 


 Upper respiratory infection, acute, Exposure to influenza, Urinary tract 

infection, Hyperglycemia, Insulin dependent diabetes mellitus





Condition: Stable


Disposition: HOME, SELF-CARE


Instructions:  Upper Respiratory Illness ([facility]), Cephalexin ([facility]), Urinary Tract 

Infection, Child ([facility])


Additional Instructions: 


Hyperglycemia (High Blood Sugar)





     You have an abnormally high blood sugar. Not all high blood sugar requires 

long-term treatment.  High blood sugar can be due to medications, pregnancy, or 

the stress of illness.  (These cases are "borderline diabetes.")  If the doctor 

feels your high blood sugar might resolve with time, you may not require 

treatment now.


     You will be scheduled for further evaluation.  It's very important that you

follow through, to see if the blood sugar returns to normal levels.  

Uncontrolled high blood sugar leads to early heart disease, strokes, nerve 

damage, eye damage, and kidney damage.


     Call the physician if there is faintness, excess sleepiness, or very rapid 

breathing.





You have influenza exposure inasmuch as your daughter has positive influenza 

test in the department today.  We will be placing you on Tamiflu prescription as

a preventative.  Also you will be given dates excuse from work while taking care

of your daughter





In addition recommend Tylenol or ibuprofen as needed for pain or aches or or 

fever.  Also rest recommend Mucinex DM 12-hour release tablet 1 tablet twice a 

day if needed for cough or congestion.


Prescriptions: 


Cephalexin Monohydrate [Keflex 500 mg Capsule] 500 mg PO QID 10 Days #40 capsule


Oseltamivir Phosphate [Tamiflu 75 mg Capsule] 75 mg PO DAILY 10 Days #10 capsule


Forms:  Return to Work





I personally performed the services described in the documentation, reviewed and

edited the documentation which was dictated to the scribe in my presence, and it

accurately records my words and actions.

## 2020-08-19 ENCOUNTER — HOSPITAL ENCOUNTER (INPATIENT)
Dept: HOSPITAL 62 - ER | Age: 25
LOS: 4 days | Discharge: HOME | DRG: 638 | End: 2020-08-23
Attending: INTERNAL MEDICINE | Admitting: FAMILY MEDICINE
Payer: MEDICARE

## 2020-08-19 DIAGNOSIS — E10.10: Primary | ICD-10-CM

## 2020-08-19 DIAGNOSIS — E10.43: ICD-10-CM

## 2020-08-19 DIAGNOSIS — Z79.4: ICD-10-CM

## 2020-08-19 DIAGNOSIS — Z93.1: ICD-10-CM

## 2020-08-19 DIAGNOSIS — F41.8: ICD-10-CM

## 2020-08-19 DIAGNOSIS — K59.00: ICD-10-CM

## 2020-08-19 DIAGNOSIS — N39.0: ICD-10-CM

## 2020-08-19 DIAGNOSIS — Z91.19: ICD-10-CM

## 2020-08-19 DIAGNOSIS — E06.3: ICD-10-CM

## 2020-08-19 DIAGNOSIS — R01.1: ICD-10-CM

## 2020-08-19 DIAGNOSIS — B96.20: ICD-10-CM

## 2020-08-19 DIAGNOSIS — D64.9: ICD-10-CM

## 2020-08-19 DIAGNOSIS — E87.6: ICD-10-CM

## 2020-08-19 DIAGNOSIS — K31.84: ICD-10-CM

## 2020-08-19 LAB
ADD MANUAL DIFF: NO
ALBUMIN SERPL-MCNC: 3.8 G/DL (ref 3.5–5)
ALP SERPL-CCNC: 147 U/L (ref 38–126)
ANION GAP SERPL CALC-SCNC: 22 MMOL/L (ref 5–19)
APPEARANCE UR: (no result)
APTT PPP: YELLOW S
AST SERPL-CCNC: 26 U/L (ref 14–36)
BASOPHILS # BLD AUTO: 0 10^3/UL (ref 0–0.2)
BASOPHILS NFR BLD AUTO: 0.5 % (ref 0–2)
BILIRUB DIRECT SERPL-MCNC: 0.2 MG/DL (ref 0–0.4)
BILIRUB SERPL-MCNC: 0.5 MG/DL (ref 0.2–1.3)
BILIRUB UR QL STRIP: NEGATIVE
BUN SERPL-MCNC: 15 MG/DL (ref 7–20)
CALCIUM: 9.4 MG/DL (ref 8.4–10.2)
CHLORIDE SERPL-SCNC: 89 MMOL/L (ref 98–107)
CO2 SERPL-SCNC: 19 MMOL/L (ref 22–30)
EOSINOPHIL # BLD AUTO: 0 10^3/UL (ref 0–0.6)
EOSINOPHIL NFR BLD AUTO: 0.2 % (ref 0–6)
ERYTHROCYTE [DISTWIDTH] IN BLOOD BY AUTOMATED COUNT: 13.7 % (ref 11.5–14)
GLUCOSE SERPL-MCNC: 547 MG/DL (ref 75–110)
GLUCOSE UR STRIP-MCNC: >=500 MG/DL
HCT VFR BLD CALC: 31.5 % (ref 36–47)
HGB BLD-MCNC: 10.8 G/DL (ref 12–15.5)
KETONES UR STRIP-MCNC: 80 MG/DL
LYMPHOCYTES # BLD AUTO: 1.6 10^3/UL (ref 0.5–4.7)
LYMPHOCYTES NFR BLD AUTO: 16.4 % (ref 13–45)
MCH RBC QN AUTO: 28.3 PG (ref 27–33.4)
MCHC RBC AUTO-ENTMCNC: 34.2 G/DL (ref 32–36)
MCV RBC AUTO: 83 FL (ref 80–97)
MONOCYTES # BLD AUTO: 1 10^3/UL (ref 0.1–1.4)
MONOCYTES NFR BLD AUTO: 10.4 % (ref 3–13)
NEUTROPHILS # BLD AUTO: 7 10^3/UL (ref 1.7–8.2)
NEUTS SEG NFR BLD AUTO: 72.5 % (ref 42–78)
NITRITE UR QL STRIP: NEGATIVE
PH UR STRIP: 6 [PH] (ref 5–9)
PLATELET # BLD: 468 10^3/UL (ref 150–450)
POTASSIUM SERPL-SCNC: 4.3 MMOL/L (ref 3.6–5)
PROT SERPL-MCNC: 9.1 G/DL (ref 6.3–8.2)
PROT UR STRIP-MCNC: 100 MG/DL
RBC # BLD AUTO: 3.81 10^6/UL (ref 3.72–5.28)
SP GR UR STRIP: 1.02
TOTAL CELLS COUNTED % (AUTO): 100 %
UROBILINOGEN UR-MCNC: NEGATIVE MG/DL (ref ?–2)
WBC # BLD AUTO: 9.6 10^3/UL (ref 4–10.5)

## 2020-08-19 PROCEDURE — 87186 SC STD MICRODIL/AGAR DIL: CPT

## 2020-08-19 PROCEDURE — 71045 X-RAY EXAM CHEST 1 VIEW: CPT

## 2020-08-19 PROCEDURE — 96374 THER/PROPH/DIAG INJ IV PUSH: CPT

## 2020-08-19 PROCEDURE — 36415 COLL VENOUS BLD VENIPUNCTURE: CPT

## 2020-08-19 PROCEDURE — 80053 COMPREHEN METABOLIC PANEL: CPT

## 2020-08-19 PROCEDURE — 99285 EMERGENCY DEPT VISIT HI MDM: CPT

## 2020-08-19 PROCEDURE — 82010 KETONE BODYS QUAN: CPT

## 2020-08-19 PROCEDURE — 93005 ELECTROCARDIOGRAM TRACING: CPT

## 2020-08-19 PROCEDURE — 82962 GLUCOSE BLOOD TEST: CPT

## 2020-08-19 PROCEDURE — 96361 HYDRATE IV INFUSION ADD-ON: CPT

## 2020-08-19 PROCEDURE — 80048 BASIC METABOLIC PNL TOTAL CA: CPT

## 2020-08-19 PROCEDURE — 81001 URINALYSIS AUTO W/SCOPE: CPT

## 2020-08-19 PROCEDURE — 84703 CHORIONIC GONADOTROPIN ASSAY: CPT

## 2020-08-19 PROCEDURE — C9113 INJ PANTOPRAZOLE SODIUM, VIA: HCPCS

## 2020-08-19 PROCEDURE — 93010 ELECTROCARDIOGRAM REPORT: CPT

## 2020-08-19 PROCEDURE — 83735 ASSAY OF MAGNESIUM: CPT

## 2020-08-19 PROCEDURE — 85025 COMPLETE CBC W/AUTO DIFF WBC: CPT

## 2020-08-19 PROCEDURE — 83036 HEMOGLOBIN GLYCOSYLATED A1C: CPT

## 2020-08-19 PROCEDURE — 87086 URINE CULTURE/COLONY COUNT: CPT

## 2020-08-19 PROCEDURE — 87088 URINE BACTERIA CULTURE: CPT

## 2020-08-19 PROCEDURE — 85027 COMPLETE CBC AUTOMATED: CPT

## 2020-08-19 PROCEDURE — 96376 TX/PRO/DX INJ SAME DRUG ADON: CPT

## 2020-08-19 RX ADMIN — SODIUM CHLORIDE, SODIUM LACTATE, POTASSIUM CHLORIDE, AND CALCIUM CHLORIDE PRN MLS/HR: .6; .31; .03; .02 INJECTION, SOLUTION INTRAVENOUS at 23:03

## 2020-08-19 RX ADMIN — DEXAMETHASONE SODIUM PHOSPHATE PRN MG: 10 INJECTION INTRAMUSCULAR; INTRAVENOUS at 23:04

## 2020-08-19 RX ADMIN — Medication SCH ML: at 22:04

## 2020-08-19 RX ADMIN — HEPARIN SODIUM SCH UNIT: 5000 INJECTION, SOLUTION INTRAVENOUS; SUBCUTANEOUS at 22:03

## 2020-08-19 NOTE — RADIOLOGY REPORT (SQ)
EXAM DESCRIPTION:  CHEST SINGLE VIEW



IMAGES COMPLETED DATE/TIME:  8/19/2020 5:38 pm



REASON FOR STUDY:  cough



COMPARISON:  2/22/2020



EXAM PARAMETERS:  NUMBER OF VIEWS: One view.

TECHNIQUE: Single frontal radiographic view of the chest acquired.

RADIATION DOSE: NA

LIMITATIONS: None.



FINDINGS:  LUNGS AND PLEURA: No opacities, masses or pneumothorax. No pleural effusion.

MEDIASTINUM AND HILAR STRUCTURES: No masses.  Contour normal.

HEART AND VASCULAR STRUCTURES: Heart normal in size.  Normal vasculature.

BONES: No acute findings.

HARDWARE: None in the chest.

OTHER: No other significant finding.



IMPRESSION:  NO ACUTE RADIOGRAPHIC FINDING IN THE CHEST.



TECHNICAL DOCUMENTATION:  JOB ID:  [id number]

 2011 [employer]- All Rights Reserved



Reading location - IP/workstation name: [email or web address]

## 2020-08-19 NOTE — ER DOCUMENT REPORT
ED General





- General


Chief Complaint: High Blood Sugar


Stated Complaint: BLOOD SUGAR PROBLEMS


Time Seen by Provider: 08/19/20 16:19


Mode of Arrival: Wheelchair


TRAVEL OUTSIDE OF THE [place] IN LAST 30 DAYS: No





- HPI


Notes: 





Patient is a 25-year-old female who presents to the emergency department for 

evaluation.  She is a type I diabetic.  She states that she has not had any 

sensors for the last couple weeks to test her blood sugar.  She has been taking 

her Lantus, but has had difficulty taking her NovoLog as a result.  She states 

over the last several days she has been very weak.  She is been nauseated.  She 

has a cough.  No fevers to her knowledge.  She has had some nausea but no 

emesis.  She has a diminished appetite, is really only eating and drinking 

minimally.  She states she is still urinating.  She states she feels slightly 

constipated.





- Related Data


Allergies/Adverse Reactions: 


                                        





adhesive tape [Adhesive Tape] Allergy (Verified 08/19/20 16:05)


   








Home Medications: Lantus, Humalog





Past Medical History





- General


Information source: Patient





- Social History


Smoking Status: Never Smoker


Chew tobacco use (# tins/day): No


Frequency of alcohol use: Occasional


Drug Abuse: None


Family History: Reviewed & Not Pertinent, DM, Hypertension





- Past Medical History


Cardiac Medical History: Reports: Hx Heart Murmur


Endocrine Medical History: Reports: Hx Diabetes Mellitus Type 1


Renal/ Medical History: Denies: Hx Peritoneal Dialysis


Psychiatric Medical History: Reports: Hx Depression


Past Surgical History: Reports: Hx Cholecystectomy





- Immunizations


Hx Diphtheria, Pertussis, Tetanus Vaccination: Yes





Review of Systems





- Review of Systems


Constitutional: See HPI


EENT: No symptoms reported


Cardiovascular: No symptoms reported


Respiratory: See HPI


Gastrointestinal: See HPI


Genitourinary: No symptoms reported


Female Genitourinary: No symptoms reported


Musculoskeletal: No symptoms reported


Skin: No symptoms reported


Neurological/Psychological: No symptoms reported





Physical Exam





- Vital signs


Vitals: 


                                        











Resp


 


 19 


 


 08/19/20 17:36














- Notes


Notes: 





This is a frail-appearing 25-year-old female who appears extremely weak, but no 

apparent distress.  Vital signs reviewed, please refer to chart. Head is 

normocephalic, atraumatic.  Pupils equal round, reactive to light.  Neck is 

supple without meningismus.  Heart reveals tachycardia.  Lungs are clear to 

auscultation bilaterally.  Abdomen is soft, nontender, normoactive bowel sounds 

throughout.  Extremities without cyanosis, clubbing. Posterior calves are no

ntender.  Peripheral pulses are equal.  Skin is warm and dry.  Patient is awake,

alert, neurological exam is nonfocal.





Course





- Re-evaluation


Re-evalutation: 





08/19/20 17:22


Patient presents to the emergency department for evaluation.  She was initially 

seen through triage and had labs ordered.  I ordered for her to be on the monit

or and additional labs were ordered as well.  Patient will be given IV fluids.  

Chest x-ray was ordered.  She is currently stable, we will continue to monitor.


08/19/20 20:02


Patient continues to feel extremely weak as well as nauseated.  She is given 

further fluids.  She is found to be in mild DKA, insulin drip is started.  Still

awaiting urinalysis.


08/19/20 21:10


Urinalysis shows ketones, but no signs of infection.  Patient has gotten 2 L and

she remained significantly tachycardic.  I spoke with Dr. Weiland, he will admit

the patient for further care.





- Vital Signs


Vital signs: 


                                        











Temp Pulse Resp BP Pulse Ox


 


       18   108/76    


 


       08/19/20 20:01  08/19/20 20:00   














- Laboratory


Result Diagrams: 


                                 08/19/20 17:30





                                 08/19/20 17:30


Laboratory results interpreted by me: 


                                        











  08/19/20 08/19/20 08/19/20





  16:03 17:30 17:30


 


Hgb   10.8 L 


 


Hct   31.5 L 


 


Plt Count   468 H 


 


Sodium    130.2 L


 


Chloride    89 L


 


Carbon Dioxide    19 L


 


Anion Gap    22 H


 


Glucose    547 H*


 


POC Glucose  470 H*  


 


Alkaline Phosphatase    147 H


 


Total Protein    9.1 H


 


Urine Protein   


 


Urine Glucose (UA)   


 


Urine Ketones   


 


Urine Blood   


 


Ur Leukocyte Esterase   














  08/19/20 08/19/20





  19:02 20:14


 


Hgb  


 


Hct  


 


Plt Count  


 


Sodium  


 


Chloride  


 


Carbon Dioxide  


 


Anion Gap  


 


Glucose  


 


POC Glucose  424 H* 


 


Alkaline Phosphatase  


 


Total Protein  


 


Urine Protein   100 H


 


Urine Glucose (UA)   >=500 H


 


Urine Ketones   80 H


 


Urine Blood   LARGE H


 


Ur Leukocyte Esterase   SMALL H














Discharge





- Discharge


Clinical Impression: 


DKA (diabetic ketoacidoses)


Qualifiers:


 Diabetes mellitus type: type 1 Diabetes mellitus complication detail: without 

coma Qualified Code(s): E10.10 - Type 1 diabetes mellitus with ketoacidosis 

without coma





Condition: Stable


Disposition: ADMITTED AS INPATIENT


Admitting Provider: Weiland (Hospitalist)


Unit Admitted: [facility]

## 2020-08-19 NOTE — ER DOCUMENT REPORT
ED Medical Screen (RME)





- General


Chief Complaint: High Blood Sugar


Stated Complaint: BLOOD SUGAR PROBLEMS


Time Seen by Provider: 08/19/20 16:19


Mode of Arrival: Wheelchair


Information source: Patient


Notes: 





HPI; 25-year-old female presents to the emergency room with shortness of breath 

and fatigue for the past 2 days.  States she is an insulin-dependent diabetic is

out of her sensors has not checked her blood sugars for 2 weeks.  Is here 

visiting her boyfriend.  Last took NovoLog insulin 9 AM this morning 8 units.  

Denies any chest pain, no nausea, no vomiting.





PE:


Alert and oriented x3.  Lungs: Clear to auscultation without rales, rhonchi, 

wheezes.  Heart: Tachycardic without murmurs, rubs, gallops.





I have greeted and performed a rapid initial assessment of this patient.  A 

comprehensive ED assessment and evaluation of the patient, analysis of test 

results and completion of the medical decision making process will be conducted 

by additional ED providers.  I have specifically instructed the patient or 

family members with the patient to immediately return to any nursing staff 

should anything change in the patient's condition or with their chief complaint.


TRAVEL OUTSIDE OF THE [place] IN LAST 30 DAYS: No





- Related Data


Allergies/Adverse Reactions: 


                                        





adhesive tape [Adhesive Tape] Allergy (Verified 08/19/20 16:05)


   











Past Medical History





- Social History


Chew tobacco use (# tins/day): No


Frequency of alcohol use: Occasional


Drug Abuse: None





- Past Medical History


Cardiac Medical History: Reports: Hx Heart Murmur


Endocrine Medical History: Reports: Hx Diabetes Mellitus Type 1


Renal/ Medical History: Denies: Hx Peritoneal Dialysis


Psychiatric Medical History: Reports: Hx Depression


Past Surgical History: Reports: Hx Cholecystectomy





- Immunizations


Hx Diphtheria, Pertussis, Tetanus Vaccination: Yes





Course





- Laboratory


Laboratory results interpreted by me: 





                                        











  08/19/20





  16:03


 


POC Glucose  470 H*

## 2020-08-20 LAB
ANION GAP SERPL CALC-SCNC: 15 MMOL/L (ref 5–19)
ANION GAP SERPL CALC-SCNC: 17 MMOL/L (ref 5–19)
ANION GAP SERPL CALC-SCNC: 19 MMOL/L (ref 5–19)
BUN SERPL-MCNC: 12 MG/DL (ref 7–20)
BUN SERPL-MCNC: 6 MG/DL (ref 7–20)
BUN SERPL-MCNC: 9 MG/DL (ref 7–20)
CALCIUM: 8.6 MG/DL (ref 8.4–10.2)
CALCIUM: 8.8 MG/DL (ref 8.4–10.2)
CALCIUM: 9.2 MG/DL (ref 8.4–10.2)
CHLORIDE SERPL-SCNC: 105 MMOL/L (ref 98–107)
CHLORIDE SERPL-SCNC: 105 MMOL/L (ref 98–107)
CHLORIDE SERPL-SCNC: 106 MMOL/L (ref 98–107)
CO2 SERPL-SCNC: 16 MMOL/L (ref 22–30)
CO2 SERPL-SCNC: 17 MMOL/L (ref 22–30)
CO2 SERPL-SCNC: 19 MMOL/L (ref 22–30)
ERYTHROCYTE [DISTWIDTH] IN BLOOD BY AUTOMATED COUNT: 13.7 % (ref 11.5–14)
GLUCOSE SERPL-MCNC: 188 MG/DL (ref 75–110)
GLUCOSE SERPL-MCNC: 252 MG/DL (ref 75–110)
GLUCOSE SERPL-MCNC: 262 MG/DL (ref 75–110)
HCT VFR BLD CALC: 26.8 % (ref 36–47)
HGB BLD-MCNC: 8.9 G/DL (ref 12–15.5)
MCH RBC QN AUTO: 27.5 PG (ref 27–33.4)
MCHC RBC AUTO-ENTMCNC: 33.2 G/DL (ref 32–36)
MCV RBC AUTO: 83 FL (ref 80–97)
PLATELET # BLD: 413 10^3/UL (ref 150–450)
POTASSIUM SERPL-SCNC: 3.6 MMOL/L (ref 3.6–5)
POTASSIUM SERPL-SCNC: 3.8 MMOL/L (ref 3.6–5)
POTASSIUM SERPL-SCNC: 3.9 MMOL/L (ref 3.6–5)
RBC # BLD AUTO: 3.23 10^6/UL (ref 3.72–5.28)
WBC # BLD AUTO: 11.5 10^3/UL (ref 4–10.5)

## 2020-08-20 RX ADMIN — PANTOPRAZOLE SODIUM SCH: 40 INJECTION, POWDER, FOR SOLUTION INTRAVENOUS at 10:21

## 2020-08-20 RX ADMIN — METOCLOPRAMIDE SCH: 5 INJECTION, SOLUTION INTRAMUSCULAR; INTRAVENOUS at 09:49

## 2020-08-20 RX ADMIN — INSULIN LISPRO SCH: 100 INJECTION, SOLUTION INTRAVENOUS; SUBCUTANEOUS at 03:21

## 2020-08-20 RX ADMIN — HEPARIN SODIUM SCH: 5000 INJECTION, SOLUTION INTRAVENOUS; SUBCUTANEOUS at 21:28

## 2020-08-20 RX ADMIN — SODIUM CHLORIDE, SODIUM LACTATE, POTASSIUM CHLORIDE, AND CALCIUM CHLORIDE PRN MLS/HR: .6; .31; .03; .02 INJECTION, SOLUTION INTRAVENOUS at 08:14

## 2020-08-20 RX ADMIN — METOCLOPRAMIDE SCH: 5 INJECTION, SOLUTION INTRAMUSCULAR; INTRAVENOUS at 11:51

## 2020-08-20 RX ADMIN — INSULIN GLARGINE SCH UNIT: 100 INJECTION, SOLUTION SUBCUTANEOUS at 21:28

## 2020-08-20 RX ADMIN — LORAZEPAM PRN MG: 2 INJECTION INTRAMUSCULAR; INTRAVENOUS at 20:50

## 2020-08-20 RX ADMIN — LORAZEPAM PRN MG: 2 INJECTION INTRAMUSCULAR; INTRAVENOUS at 13:06

## 2020-08-20 RX ADMIN — HEPARIN SODIUM SCH UNIT: 5000 INJECTION, SOLUTION INTRAVENOUS; SUBCUTANEOUS at 06:32

## 2020-08-20 RX ADMIN — LORAZEPAM PRN MG: 2 INJECTION INTRAMUSCULAR; INTRAVENOUS at 01:55

## 2020-08-20 RX ADMIN — PROMETHAZINE HYDROCHLORIDE PRN MG: 25 INJECTION INTRAMUSCULAR; INTRAVENOUS at 13:07

## 2020-08-20 RX ADMIN — Medication SCH: at 14:44

## 2020-08-20 RX ADMIN — INSULIN LISPRO SCH UNIT: 100 INJECTION, SOLUTION INTRAVENOUS; SUBCUTANEOUS at 04:11

## 2020-08-20 RX ADMIN — PANTOPRAZOLE SODIUM SCH: 40 INJECTION, POWDER, FOR SOLUTION INTRAVENOUS at 21:29

## 2020-08-20 RX ADMIN — METOCLOPRAMIDE SCH: 5 INJECTION, SOLUTION INTRAMUSCULAR; INTRAVENOUS at 21:08

## 2020-08-20 RX ADMIN — HEPARIN SODIUM SCH: 5000 INJECTION, SOLUTION INTRAVENOUS; SUBCUTANEOUS at 14:44

## 2020-08-20 RX ADMIN — PROMETHAZINE HYDROCHLORIDE PRN MG: 25 INJECTION INTRAMUSCULAR; INTRAVENOUS at 20:50

## 2020-08-20 RX ADMIN — DOCUSATE SODIUM SCH: 100 CAPSULE, LIQUID FILLED ORAL at 10:17

## 2020-08-20 RX ADMIN — SODIUM CHLORIDE, SODIUM LACTATE, POTASSIUM CHLORIDE, AND CALCIUM CHLORIDE PRN MLS/HR: .6; .31; .03; .02 INJECTION, SOLUTION INTRAVENOUS at 05:25

## 2020-08-20 RX ADMIN — ERYTHROMYCIN LACTOBIONATE SCH MLS/HR: 500 INJECTION, POWDER, LYOPHILIZED, FOR SOLUTION INTRAVENOUS at 14:38

## 2020-08-20 RX ADMIN — Medication SCH: at 21:08

## 2020-08-20 RX ADMIN — Medication SCH: at 06:32

## 2020-08-20 RX ADMIN — DOCUSATE SODIUM SCH: 100 CAPSULE, LIQUID FILLED ORAL at 17:28

## 2020-08-20 RX ADMIN — METOCLOPRAMIDE SCH: 5 INJECTION, SOLUTION INTRAMUSCULAR; INTRAVENOUS at 17:09

## 2020-08-20 RX ADMIN — INSULIN LISPRO SCH UNIT: 100 INJECTION, SOLUTION INTRAVENOUS; SUBCUTANEOUS at 12:39

## 2020-08-20 RX ADMIN — PROMETHAZINE HYDROCHLORIDE PRN MG: 25 INJECTION INTRAMUSCULAR; INTRAVENOUS at 06:28

## 2020-08-20 RX ADMIN — SODIUM CHLORIDE, SODIUM LACTATE, POTASSIUM CHLORIDE, AND CALCIUM CHLORIDE PRN MLS/HR: .6; .31; .03; .02 INJECTION, SOLUTION INTRAVENOUS at 01:25

## 2020-08-20 RX ADMIN — ERYTHROMYCIN LACTOBIONATE SCH MLS/HR: 500 INJECTION, POWDER, LYOPHILIZED, FOR SOLUTION INTRAVENOUS at 21:29

## 2020-08-20 RX ADMIN — INSULIN LISPRO SCH UNIT: 100 INJECTION, SOLUTION INTRAVENOUS; SUBCUTANEOUS at 17:04

## 2020-08-20 RX ADMIN — PROMETHAZINE HYDROCHLORIDE PRN MG: 25 INJECTION INTRAMUSCULAR; INTRAVENOUS at 01:55

## 2020-08-20 RX ADMIN — INSULIN LISPRO SCH UNIT: 100 INJECTION, SOLUTION INTRAVENOUS; SUBCUTANEOUS at 08:13

## 2020-08-20 RX ADMIN — INSULIN LISPRO SCH UNIT: 100 INJECTION, SOLUTION INTRAVENOUS; SUBCUTANEOUS at 20:26

## 2020-08-20 RX ADMIN — SODIUM CHLORIDE, SODIUM LACTATE, POTASSIUM CHLORIDE, AND CALCIUM CHLORIDE PRN MLS/HR: .6; .31; .03; .02 INJECTION, SOLUTION INTRAVENOUS at 03:20

## 2020-08-20 RX ADMIN — CEFTRIAXONE SCH MLS/HR: 2 INJECTION, SOLUTION INTRAVENOUS at 17:05

## 2020-08-20 NOTE — PDOC PROGRESS REPORT
Subjective


Subjective:: 





Patient admitted for DKA with seems to be a recurrent admitting diagnosis for 

her.  Patient reportedly has a history of refusing various aspects of care and 

she continues to do this here today.  During my encounter, she states she is 

refusing to take her Protonix because she "do not want it" when I asked her why 

she refuses to answer me and simply repeats herself.  I offered answer any 

questions or concerns she had about the medication and she only continued to 

repeat herself and became frustrated with me.  I started her on IV erythromycin 

as she states she has an allergy to metoclopramide.  Her A1c is 12.5 and its 

likely she has quite severe gastroparesis as a result of longstanding 

uncontrolled diabetes.  UA showed ketones but there were no beta hydroxybutyrate

or acetone drawn by ED.  I have ordered beta hydroxybutyrate here.  Patient was 

taken off insulin drip on admission however her long-acting insulin was not 

restarted.  Of note, her gap has been rising and her CO2 has been dropping since

this occurred.  I have restarted her long-acting insulin.  She is also getting 

500 cc/h of IV fluids and I have changed this to a more reasonable 125 cc/h.


Reason For Visit: 


DIABETIC KETOACIDOSIS








Physical Exam


Vital Signs: 


                                        











Temp Pulse Resp BP Pulse Ox


 


 99.8 F   118 H  18   136/85 H  100 


 


 08/20/20 11:40  08/20/20 11:40  08/20/20 11:40  08/20/20 11:40  08/20/20 11:40








                                 Intake & Output











 08/19/20 08/20/20 08/21/20





 06:59 06:59 06:59


 


Intake Total  5338 


 


Output Total  1300 


 


Balance  4038 


 


Weight  46.6 kg 46.6 kg











General appearance: PRESENT: no acute distress, thin


Head exam: PRESENT: atraumatic, normocephalic


Eye exam: PRESENT: conjunctiva pink


Ear exam: PRESENT: normal external ear exam


Mouth exam: PRESENT: moist


Neck exam: ABSENT: carotid bruit, JVD, lymphadenopathy, thyromegaly


Respiratory exam: PRESENT: clear to auscultation jessika.  ABSENT: rales, rhonchi, 

wheezes


Cardiovascular exam: PRESENT: RRR.  ABSENT: diastolic murmur, rubs, systolic 

murmur


Pulses: PRESENT: normal dorsalis pedis pul


Vascular exam: PRESENT: normal capillary refill


GI/Abdominal exam: PRESENT: normal bowel sounds, soft.  ABSENT: distended, 

guarding, mass, organolmegaly, rebound, tenderness


Rectal exam: PRESENT: deferred


Extremities exam: PRESENT: full ROM.  ABSENT: calf tenderness, clubbing, pedal 

edema


Neurological exam: PRESENT: alert, awake, oriented to person, oriented to place,

oriented to time, oriented to situation


Psychiatric exam: PRESENT: appropriate affect, normal mood


Skin exam: PRESENT: dry, intact, warm





Results


Laboratory Results: 


                                        





                                 08/20/20 05:46 





                                 08/20/20 13:39 





                                        











  08/19/20 08/19/20 08/19/20





  17:30 17:30 17:30


 


WBC  9.6  


 


RBC  3.81  


 


Hgb  10.8 L  


 


Hct  31.5 L  


 


MCV  83  


 


MCH  28.3  


 


MCHC  34.2  


 


RDW  13.7  


 


Plt Count  468 H  


 


Seg Neutrophils %  72.5  


 


Sodium   130.2 L 


 


Potassium   4.3 


 


Chloride   89 L 


 


Carbon Dioxide   19 L 


 


Anion Gap   22 H 


 


BUN   15 


 


Creatinine   0.77 


 


Est GFR ( Amer)   > 60 


 


Glucose   547 H* 


 


Calcium   9.4 


 


Magnesium   


 


Total Bilirubin   0.5 


 


AST   26 


 


Alkaline Phosphatase   147 H 


 


Total Protein   9.1 H 


 


Albumin   3.8 


 


Serum HCG, Qual    NEGATIVE


 


Urine Color   


 


Urine Appearance   


 


Urine pH   


 


Ur Specific Gravity   


 


Urine Protein   


 


Urine Glucose (UA)   


 


Urine Ketones   


 


Urine Blood   


 


Urine Nitrite   


 


Ur Leukocyte Esterase   


 


Urine WBC (Auto)   


 


Urine RBC (Auto)   














  08/19/20 08/20/20 08/20/20





  20:14 00:30 05:46


 


WBC    11.5 H


 


RBC    3.23 L


 


Hgb    8.9 L


 


Hct    26.8 L


 


MCV    83


 


MCH    27.5


 


MCHC    33.2


 


RDW    13.7


 


Plt Count    413


 


Seg Neutrophils %   


 


Sodium   138.7 


 


Potassium   3.6 


 


Chloride   105 


 


Carbon Dioxide   19 L 


 


Anion Gap   15 


 


BUN   12 


 


Creatinine   0.63 


 


Est GFR ( Amer)   > 60 


 


Glucose   188 H 


 


Calcium   8.6 


 


Magnesium   2.0 


 


Total Bilirubin   


 


AST   


 


Alkaline Phosphatase   


 


Total Protein   


 


Albumin   


 


Serum HCG, Qual   


 


Urine Color  YELLOW  


 


Urine Appearance  SLIGHTLY-CLOUDY  


 


Urine pH  6.0  


 


Ur Specific Gravity  1.018  


 


Urine Protein  100 H  


 


Urine Glucose (UA)  >=500 H  


 


Urine Ketones  80 H  


 


Urine Blood  LARGE H  


 


Urine Nitrite  NEGATIVE  


 


Ur Leukocyte Esterase  SMALL H  


 


Urine WBC (Auto)  37  


 


Urine RBC (Auto)  5  














  08/20/20 08/20/20





  05:46 13:39


 


WBC  


 


RBC  


 


Hgb  


 


Hct  


 


MCV  


 


MCH  


 


MCHC  


 


RDW  


 


Plt Count  


 


Seg Neutrophils %  


 


Sodium  139.3  140.7


 


Potassium  3.9  3.8


 


Chloride  106  105


 


Carbon Dioxide  16 L  17 L


 


Anion Gap  17  19


 


BUN  9  6 L


 


Creatinine  0.56  0.52


 


Est GFR ( Amer)  > 60  > 60


 


Glucose  262 H  252 H


 


Calcium  8.8  9.2


 


Magnesium  1.7 


 


Total Bilirubin  


 


AST  


 


Alkaline Phosphatase  


 


Total Protein  


 


Albumin  


 


Serum HCG, Qual  


 


Urine Color  


 


Urine Appearance  


 


Urine pH  


 


Ur Specific Gravity  


 


Urine Protein  


 


Urine Glucose (UA)  


 


Urine Ketones  


 


Urine Blood  


 


Urine Nitrite  


 


Ur Leukocyte Esterase  


 


Urine WBC (Auto)  


 


Urine RBC (Auto)  











Impressions: 


                                        





Chest X-Ray  08/19/20 17:17


IMPRESSION:  NO ACUTE RADIOGRAPHIC FINDING IN THE CHEST.


 














Assessment and Plan





- Diagnosis


(1) DKA (diabetic ketoacidoses)


Qualifiers: 


   Diabetes mellitus type: type 1   Diabetes mellitus complication detail: 

without coma   Qualified Code(s): E10.10 - Type 1 diabetes mellitus with 

ketoacidosis without coma   


Is this a current diagnosis for this admission?: Yes   


Plan: 





Due to medication noncompliance and likely UTI


Ketones in urine on UA


Blood sugar in the 400s on admission, started on insulin drip, later stopped 

and was not put on any long acting insulin, later put back on her long-acting 

insulin with improvement in her blood sugars


Sliding scale insulin and Accu-Cheks


A1c 12.5


Beta hydroxybutyrate pending


Poor long-term prognosis if she continues to have medication noncompliance








(2) UTI (urinary tract infection)


Is this a current diagnosis for this admission?: Yes   


Plan: 





Present on admission


UA showed infection


Urine culture growing gram-negative rods


Treat with ceftriaxone IV


Likely cause/contributor to DKA








(3) Noncompliance


Is this a current diagnosis for this admission?: Yes   


Plan: 





Patient continues to refuse various aspects of care and refuses to discuss her 

reasoning why








(4) Anxiety


Is this a current diagnosis for this admission?: Yes   





(5) Depression


Qualifiers: 


   Depression Type: unspecified   Qualified Code(s): F32.9 - Major depressive 

disorder, single episode, unspecified   


Is this a current diagnosis for this admission?: Yes   





- Plan Summary


Summary: 


Patient will be admitted [facility] where she will receive routine supportive and 

symptomatic cares.  She will be treated with an insulin infusion per protocol 

and will receive high-volume IV fluids utilizing lactated Ringer's solution.  

She will use Ativan 1 mg IV every 4 hours as needed for anxiety or restlessness.

 She will use morphine sulfate 2 to 4 mg IV every 2 hours as needed for pain.  

She will receive a diabetic diet.  CBCs, metabolic profiles and additional lab

oratory and/or radiographic evaluations will be obtained as appropriate.  Anemia

profile will be obtained.  Case management consultation will be obtained in 

order to assist the patient and obtaining continuous glucose monitoring devices 

to allow her to better control her diabetes.





- Time


Time Spent with patient: 25-34 minutes


Medications reviewed and adjusted accordingly: Yes


Anticipated Discharge Disposition: Home, Self Care


Anticipated Discharge Timeframe: within 48 hours





- Inpatient Certification


Based on my medical assessment, after consideration of the patient's 

comorbidities, presenting symptoms, or acuity I expect that the services needed 

warrant INPATIENT care.: Yes


I certify that my determination is in accordance with my understanding of 

Medicare's requirements for reasonable and necessary INPATIENT services [42 CFR 

412.3e].: Yes


Medical Necessity: Significant Comorbidiites Make Outpatient Treatment Too 

Risky, Need Close Monitoring Due to Risk of Patient Decompensation, Need For IV 

Fluids, Risk of Complication if Not Cared For in Hospital, Risk of Diagnosis 

Which Will Require Inpatient Eval/Care/Monitoring

## 2020-08-20 NOTE — EKG REPORT
SEVERITY:- OTHERWISE NORMAL ECG -

SINUS TACHYCARDIA

LEFT AXIS DEVIATION

:

Confirmed by: Karoline Liu 20-Aug-2020 18:39:34

## 2020-08-20 NOTE — PDOC H&P
History of Present Illness


Admission Date/PCP: 


08/19/2020  21:15


No local PCP


Patient complains of: Hyperglycemia


History of Present Illness: 


AMY PACHECO is a 25 year old female who presented to the emergency room 

with a one-week history of hyperglycemic symptoms.  She admits running out of 

her continuous glucose monitoring sensors 2 weeks ago and over the last week she

has been experiencing symptoms of hyperglycemia including generalized weakness, 

nausea and malaise.  She admits accompanying poor oral intake and associated 

constipation.  She denies other associated or accompanying signs and symptoms.  

She admits prior similar episodes with DKA.  She has made efforts at controlling

her sugar by continuing her Lantus insulin but without glucose monitoring she 

has been unable to take her NovoLog.  She denies identification of any 

additional aggravating or ameliorating factors for her hyperglycemic symptoms.  

In the emergency room she was noted to have a glucose greater than 500 and a 

mild acidosis, but she did not show dramatic clinical improvement with IV fluids

and insulin.  She was subsequently admitted to the hospital for further 

evaluation and treatment.





Past Medical History


Cardiac Medical History: Reports: Congestive Heart Failure - Acute episode 

postpartum, Heart Murmur


   Denies: DVT, Hyperlipidema, Hypertension, Pulmonary Embolism


Pulmonary Medical History: 


   Denies: Asthma, Chronic Obstructive Pulmonary Disease (COPD)


EENT Medical History: 


   Denies: Cataracts, Eyes - Diabetic retinopathy, Ears - Hearing aids


Neurological Medical History: 


   Denies: Multiple Sclerosis, Seizures


Endocrine Medical History: Reports: Diabetes Mellitus Type 1, Other - 

Hashimoto's thyroiditis


   Denies: Hyperthyroidism, Hypothyroidism, Obesity


Renal/ Medical History: 


   Denies: Chronic Kidney Disease, Nephrolithiasis


Malignancy Medical History: Reports: None


GI Medical History: 


   Denies: Cirrhosis, Hepatitis, Peptic Ulcer Disease


Musculoskeltal Medical History: 


   Denies: Arthritis, Fibromyalgia


Skin Medical History: 


   Denies: Eczema, Psoriasis


Psychiatric Medical History: Reports: Depression, General Anxiety Disorder


   Denies: Alcohol Dependency, Substance Abuse, Tobacco Dependency


Traumatic Medical History: Reports: None


Hematology: Reports: Anemia - Chronic


   Denies: Bleeding Tendencies


Infectious Medical History: Reports: None





Past Surgical History


Past Surgical History: Reports: Cholecystectomy





Social History


Information Source: Patient


Lives with: Alone


Smoking Status: Never Smoker


Electronic Cigarette use?: No


Frequency of Alcohol Use: None


Hx Recreational Drug Use: No


Drugs: None


Hx Prescription Drug Abuse: No





- Advance Directive


Resuscitation Status: Full Code


Surrogate healthcare decision maker:: 


Patient elects not to provide a designated surrogate medical decision-maker at 

this time





Family History


Family History: DM, Hypertension, Other - Bipolar disorder, asthma


Parental Family History Reviewed: Yes


Children Family History Reviewed: No


Sibling(s) Family History Reviewed.: Yes





Medication/Allergy


Home Medications: 








Insulin Aspart [Novolog Flexpen] 10 unit SUBCUT .SLD SCALE 08/19/20 


Insulin Glargine,Hum.rec.anlog [Lantus Insulin 100 Unit/mL Insulin Pen] 25 unit 

SUBCUT QHS 08/19/20 








Allergies/Adverse Reactions: 


                                        





adhesive tape [Adhesive Tape] Allergy (Verified 08/19/20 16:05)


   











Review of Systems


Constitutional: PRESENT: as per HPI, anorexia, weakness.  ABSENT: chills, 

fever(s)


Eyes: ABSENT: visual disturbances, other - Eye pain


Ears: ABSENT: hearing changes, other - Ear pain


Nose, Mouth, and Throat: ABSENT: headache(s), sore throat


Cardiovascular: ABSENT: chest pain, palpitations


Respiratory: PRESENT: cough - Occasional nonproductive cough.  ABSENT: dyspnea, 

hemoptysis, sputum


Gastrointestinal: PRESENT: as per HPI, constipation, nausea.  ABSENT: abdominal 

pain, diarrhea, vomiting


Genitourinary: ABSENT: difficulty urinating, dysuria, hematuria


Musculoskeletal: PRESENT: muscle weakness - Generalized.  ABSENT: back pain, 

joint swelling


Integumentary: ABSENT: pruritus, rash


Neurological: ABSENT: confusion, convulsions, focal weakness, memory loss, 

syncope


Psychiatric: ABSENT: anxiety, depression


Endocrine: ABSENT: cold intolerance, heat intolerance


Hematologic/Lymphatic: ABSENT: easy bleeding, easy bruising


Allergic/Immunologic: ABSENT: seasonal rhinorrhea





Physical Exam


Vital Signs: 


                                        











Temp Pulse Resp BP Pulse Ox


 


       18   108/76    


 


       08/19/20 20:01  08/19/20 20:00   








                                 Intake & Output











 08/17/20 08/18/20 08/19/20





 23:59 23:59 23:59


 


Intake Total   2000


 


Balance   2000


 


Weight   52.163 kg











General appearance: PRESENT: no acute distress, cooperative, other - Faint 

ketotic odor noted on patient's breath


Head exam: PRESENT: atraumatic


Eye exam: PRESENT: conjunctiva pink.  ABSENT: conjunctival injection, scleral 

icterus


Ear exam: PRESENT: normal external ear exam.  ABSENT: bleeding, drainage


Mouth exam: PRESENT: dry mucosa, neck supple


Neck exam: ABSENT: thyromegaly, tracheal deviation


Respiratory exam: PRESENT: clear to auscultation jessika, symmetrical, unlabored


Cardiovascular exam: PRESENT: RRR.  ABSENT: clicks, gallop, rubs


Pulses: PRESENT: normal radial pulses, normal dorsalis pedis pul


Vascular exam: PRESENT: normal capillary refill.  ABSENT: pallor


GI/Abdominal exam: PRESENT: normal bowel sounds, soft


Rectal exam: PRESENT: deferred


Extremities exam: ABSENT: joint swelling, pedal edema


Musculoskeletal exam: ABSENT: deformity, dislocation


Neurological exam: PRESENT: alert, oriented to person, oriented to place, 

oriented to time, oriented to situation, CN II-XII grossly intact.  ABSENT: 

motor sensory deficit


Psychiatric exam: PRESENT: appropriate affect, normal mood


Skin exam: PRESENT: dry, intact, warm.  ABSENT: jaundice, rash, urticaria





Results


Laboratory Results: 


                                        





                                 08/19/20 17:30 





                                 08/19/20 17:30 





                                        











  08/19/20 08/19/20 08/19/20





  17:30 17:30 17:30


 


WBC  9.6  


 


RBC  3.81  


 


Hgb  10.8 L  


 


Hct  31.5 L  


 


MCV  83  


 


MCH  28.3  


 


MCHC  34.2  


 


RDW  13.7  


 


Plt Count  468 H  


 


Seg Neutrophils %  72.5  


 


Sodium   130.2 L 


 


Potassium   4.3 


 


Chloride   89 L 


 


Carbon Dioxide   19 L 


 


Anion Gap   22 H 


 


BUN   15 


 


Creatinine   0.77 


 


Est GFR ( Amer)   > 60 


 


Glucose   547 H* 


 


Calcium   9.4 


 


Total Bilirubin   0.5 


 


AST   26 


 


Alkaline Phosphatase   147 H 


 


Total Protein   9.1 H 


 


Albumin   3.8 


 


Serum HCG, Qual    NEGATIVE


 


Urine Color   


 


Urine Appearance   


 


Urine pH   


 


Ur Specific Gravity   


 


Urine Protein   


 


Urine Glucose (UA)   


 


Urine Ketones   


 


Urine Blood   


 


Urine Nitrite   


 


Ur Leukocyte Esterase   


 


Urine WBC (Auto)   


 


Urine RBC (Auto)   














  08/19/20





  20:14


 


WBC 


 


RBC 


 


Hgb 


 


Hct 


 


MCV 


 


MCH 


 


MCHC 


 


RDW 


 


Plt Count 


 


Seg Neutrophils % 


 


Sodium 


 


Potassium 


 


Chloride 


 


Carbon Dioxide 


 


Anion Gap 


 


BUN 


 


Creatinine 


 


Est GFR (African Amer) 


 


Glucose 


 


Calcium 


 


Total Bilirubin 


 


AST 


 


Alkaline Phosphatase 


 


Total Protein 


 


Albumin 


 


Serum HCG, Qual 


 


Urine Color  YELLOW


 


Urine Appearance  SLIGHTLY-CLOUDY


 


Urine pH  6.0


 


Ur Specific Gravity  1.018


 


Urine Protein  100 H


 


Urine Glucose (UA)  >=500 H


 


Urine Ketones  80 H


 


Urine Blood  LARGE H


 


Urine Nitrite  NEGATIVE


 


Ur Leukocyte Esterase  SMALL H


 


Urine WBC (Auto)  37


 


Urine RBC (Auto)  5











Impressions: 


                                        





Chest X-Ray  08/19/20 17:17


IMPRESSION:  NO ACUTE RADIOGRAPHIC FINDING IN THE CHEST.


 














Assessment and Plan





- Diagnosis


(1) DKA (diabetic ketoacidoses)


Qualifiers: 


   Diabetes mellitus type: type 1   Diabetes mellitus complication detail: 

without coma   Qualified Code(s): E10.10 - Type 1 diabetes mellitus with 

ketoacidosis without coma   


Is this a current diagnosis for this admission?: Yes   





(2) Normochromic normocytic anemia


Is this a current diagnosis for this admission?: Yes   





(3) Anxiety


Is this a current diagnosis for this admission?: Yes   





(4) Depression


Qualifiers: 


   Depression Type: unspecified   Qualified Code(s): F32.9 - Major depressive di

sorder, single episode, unspecified   


Is this a current diagnosis for this admission?: Yes   





- Plan Summary


Summary: 


Patient will be admitted [facility] [place] where she will receive routine supportive and 

symptomatic cares.  She will be treated with an insulin infusion per protocol 

and will receive high-volume IV fluids utilizing lactated Ringer's solution.  

She will use Ativan 1 mg IV every 4 hours as needed for anxiety or restlessness.

 She will use morphine sulfate 2 to 4 mg IV every 2 hours as needed for pain.  

She will receive a diabetic diet.  CBCs, metabolic profiles and additional 

laboratory and/or radiographic evaluations will be obtained as appropriate.  

Anemia profile will be obtained.  Case management consultation will be obtained 

in order to assist the patient and obtaining continuous glucose monitoring 

devices to allow her to better control her diabetes.





- Time


Time Spent with patient: 15-24 minutes


Medications reviewed and adjusted accordingly: Yes


Anticipated Discharge Disposition: Home, Self Care


Anticipated Discharge Timeframe: within 72 hours





- Inpatient Certification


Based on my medical assessment, after consideration of the patient's 

comorbidities, presenting symptoms, or acuity I expect that the services needed 

warrant INPATIENT care.: Yes


I certify that my determination is in accordance with my understanding of 

Medicare's requirements for reasonable and necessary INPATIENT services [42 CFR 

412.3e].: Yes


Medical Necessity: Need Close Monitoring Due to Risk of Patient Decompensation, 

Need For IV Fluids, Risk of Complication if Not Cared For in Hospital

## 2020-08-21 LAB
ADD MANUAL DIFF: NO
ANION GAP SERPL CALC-SCNC: 16 MMOL/L (ref 5–19)
BASOPHILS # BLD AUTO: 0.1 10^3/UL (ref 0–0.2)
BASOPHILS NFR BLD AUTO: 0.6 % (ref 0–2)
BUN SERPL-MCNC: 4 MG/DL (ref 7–20)
CALCIUM: 8.9 MG/DL (ref 8.4–10.2)
CHLORIDE SERPL-SCNC: 106 MMOL/L (ref 98–107)
CO2 SERPL-SCNC: 19 MMOL/L (ref 22–30)
EOSINOPHIL # BLD AUTO: 0 10^3/UL (ref 0–0.6)
EOSINOPHIL NFR BLD AUTO: 0.1 % (ref 0–6)
ERYTHROCYTE [DISTWIDTH] IN BLOOD BY AUTOMATED COUNT: 14.2 % (ref 11.5–14)
GLUCOSE SERPL-MCNC: 207 MG/DL (ref 75–110)
HCT VFR BLD CALC: 31 % (ref 36–47)
HGB BLD-MCNC: 10.4 G/DL (ref 12–15.5)
LYMPHOCYTES # BLD AUTO: 1.4 10^3/UL (ref 0.5–4.7)
LYMPHOCYTES NFR BLD AUTO: 10.1 % (ref 13–45)
MCH RBC QN AUTO: 27.6 PG (ref 27–33.4)
MCHC RBC AUTO-ENTMCNC: 33.6 G/DL (ref 32–36)
MCV RBC AUTO: 82 FL (ref 80–97)
MONOCYTES # BLD AUTO: 1.6 10^3/UL (ref 0.1–1.4)
MONOCYTES NFR BLD AUTO: 11.7 % (ref 3–13)
NEUTROPHILS # BLD AUTO: 10.7 10^3/UL (ref 1.7–8.2)
NEUTS SEG NFR BLD AUTO: 77.5 % (ref 42–78)
PLATELET # BLD: 400 10^3/UL (ref 150–450)
POTASSIUM SERPL-SCNC: 3.5 MMOL/L (ref 3.6–5)
RBC # BLD AUTO: 3.77 10^6/UL (ref 3.72–5.28)
TOTAL CELLS COUNTED % (AUTO): 100 %
WBC # BLD AUTO: 13.8 10^3/UL (ref 4–10.5)

## 2020-08-21 RX ADMIN — METOCLOPRAMIDE SCH MG: 5 INJECTION, SOLUTION INTRAMUSCULAR; INTRAVENOUS at 08:41

## 2020-08-21 RX ADMIN — LORAZEPAM PRN MG: 2 INJECTION INTRAMUSCULAR; INTRAVENOUS at 20:09

## 2020-08-21 RX ADMIN — CEFTRIAXONE SCH MLS/HR: 2 INJECTION, SOLUTION INTRAVENOUS at 09:49

## 2020-08-21 RX ADMIN — HEPARIN SODIUM SCH: 5000 INJECTION, SOLUTION INTRAVENOUS; SUBCUTANEOUS at 06:05

## 2020-08-21 RX ADMIN — SODIUM CHLORIDE, SODIUM LACTATE, POTASSIUM CHLORIDE, AND CALCIUM CHLORIDE PRN MLS/HR: .6; .31; .03; .02 INJECTION, SOLUTION INTRAVENOUS at 18:23

## 2020-08-21 RX ADMIN — PANTOPRAZOLE SODIUM SCH: 40 INJECTION, POWDER, FOR SOLUTION INTRAVENOUS at 22:59

## 2020-08-21 RX ADMIN — INSULIN LISPRO SCH UNIT: 100 INJECTION, SOLUTION INTRAVENOUS; SUBCUTANEOUS at 08:41

## 2020-08-21 RX ADMIN — ERYTHROMYCIN LACTOBIONATE SCH MLS/HR: 500 INJECTION, POWDER, LYOPHILIZED, FOR SOLUTION INTRAVENOUS at 22:03

## 2020-08-21 RX ADMIN — DOCUSATE SODIUM SCH: 100 CAPSULE, LIQUID FILLED ORAL at 09:47

## 2020-08-21 RX ADMIN — INSULIN LISPRO SCH UNIT: 100 INJECTION, SOLUTION INTRAVENOUS; SUBCUTANEOUS at 20:31

## 2020-08-21 RX ADMIN — PANTOPRAZOLE SODIUM SCH: 40 INJECTION, POWDER, FOR SOLUTION INTRAVENOUS at 09:47

## 2020-08-21 RX ADMIN — INSULIN LISPRO SCH UNIT: 100 INJECTION, SOLUTION INTRAVENOUS; SUBCUTANEOUS at 12:32

## 2020-08-21 RX ADMIN — Medication SCH: at 06:05

## 2020-08-21 RX ADMIN — HEPARIN SODIUM SCH: 5000 INJECTION, SOLUTION INTRAVENOUS; SUBCUTANEOUS at 22:59

## 2020-08-21 RX ADMIN — HEPARIN SODIUM SCH: 5000 INJECTION, SOLUTION INTRAVENOUS; SUBCUTANEOUS at 13:51

## 2020-08-21 RX ADMIN — PROMETHAZINE HYDROCHLORIDE PRN MG: 25 INJECTION INTRAMUSCULAR; INTRAVENOUS at 05:05

## 2020-08-21 RX ADMIN — LORAZEPAM PRN MG: 2 INJECTION INTRAMUSCULAR; INTRAVENOUS at 05:05

## 2020-08-21 RX ADMIN — ERYTHROMYCIN LACTOBIONATE SCH MLS/HR: 500 INJECTION, POWDER, LYOPHILIZED, FOR SOLUTION INTRAVENOUS at 05:05

## 2020-08-21 RX ADMIN — PROMETHAZINE HYDROCHLORIDE PRN MG: 25 INJECTION INTRAMUSCULAR; INTRAVENOUS at 12:30

## 2020-08-21 RX ADMIN — PROMETHAZINE HYDROCHLORIDE PRN MG: 25 INJECTION INTRAMUSCULAR; INTRAVENOUS at 20:09

## 2020-08-21 RX ADMIN — INSULIN LISPRO SCH UNIT: 100 INJECTION, SOLUTION INTRAVENOUS; SUBCUTANEOUS at 18:24

## 2020-08-21 RX ADMIN — ERYTHROMYCIN LACTOBIONATE SCH MLS/HR: 500 INJECTION, POWDER, LYOPHILIZED, FOR SOLUTION INTRAVENOUS at 14:10

## 2020-08-21 RX ADMIN — DOCUSATE SODIUM SCH: 100 CAPSULE, LIQUID FILLED ORAL at 18:13

## 2020-08-21 RX ADMIN — INSULIN LISPRO SCH: 100 INJECTION, SOLUTION INTRAVENOUS; SUBCUTANEOUS at 04:43

## 2020-08-21 RX ADMIN — Medication SCH: at 13:52

## 2020-08-21 RX ADMIN — INSULIN LISPRO SCH UNIT: 100 INJECTION, SOLUTION INTRAVENOUS; SUBCUTANEOUS at 00:55

## 2020-08-21 RX ADMIN — METOCLOPRAMIDE SCH: 5 INJECTION, SOLUTION INTRAMUSCULAR; INTRAVENOUS at 12:40

## 2020-08-21 RX ADMIN — INSULIN GLARGINE SCH UNIT: 100 INJECTION, SOLUTION SUBCUTANEOUS at 22:04

## 2020-08-21 NOTE — PDOC PROGRESS REPORT
Subjective


Subjective:: 





Patient admitted for DKA with seems to be a recurrent admitting diagnosis for 

her.  Patient reportedly has a history of refusing various aspects of care and 

she continues to do this here today.  During my encounter, she states she is 

refusing to take her Protonix because she "do not want it" when I asked her why 

she refuses to answer me and simply repeats herself.  I offered answer any 

questions or concerns she had about the medication and she only continued to 

repeat herself and became frustrated with me.  I started her on IV erythromycin 

as she states she has an allergy to metoclopramide.  Her A1c is 12.5 and its 

likely she has quite severe gastroparesis as a result of longstanding 

uncontrolled diabetes.  UA showed ketones but there were no beta hydroxybutyrate

or acetone drawn by ED.  I have ordered beta hydroxybutyrate here.  Patient was 

taken off insulin drip on admission however her long-acting insulin was not 

restarted.  Of note, her gap has been rising and her CO2 has been dropping since

this occurred.  I have restarted her long-acting insulin.  She is also getting 

500 cc/h of IV fluids and I have changed this to a more reasonable 125 cc/h.





8/21/2020


Patient is very slightly more talkative today.  She states she continues to 

vomit and is unable to tolerate a diet.  I looked in the trash can sitting next 

to her bed and I did have vomit streaking down the bag.  She states she is 

agreeable to taking the Protonix after I spoke with her about it again today.  

Her vomiting does seem to be getting less and less as her blood sugar is 

controlled, UTI is treated, and she continues to get prokinetic agents here.  

Per nursing, she got a dose of Reglan but this had no adverse effect on the 

patient.  Patient will continue to get erythromycin instead of this due to 

supposed allergy.  Patient does not voice any specific complaints besides 

vomiting and generalized fatigue and weakness.


Reason For Visit: 


DIABETIC KETOACIDOSIS








Physical Exam


Vital Signs: 


                                        











Temp Pulse Resp BP Pulse Ox


 


 100.3 F   123 H  18   132/78 H  100 


 


 08/21/20 15:19  08/21/20 15:19  08/21/20 15:19  08/21/20 15:19  08/21/20 15:19








                                 Intake & Output











 08/20/20 08/21/20 08/22/20





 06:59 06:59 06:59


 


Intake Total 5338 1000 100


 


Output Total 1300 1550 


 


Balance 4038 -550 100


 


Weight 46.6 kg 46 kg 











General appearance: PRESENT: no acute distress, cooperative, well-developed, 

well-nourished


Head exam: PRESENT: atraumatic, normocephalic


Eye exam: PRESENT: conjunctiva pink


Mouth exam: PRESENT: moist


Respiratory exam: PRESENT: clear to auscultation jessika.  ABSENT: rales, rhonchi, 

wheezes


Cardiovascular exam: PRESENT: RRR.  ABSENT: diastolic murmur, rubs, systolic 

murmur


GI/Abdominal exam: PRESENT: normal bowel sounds, soft.  ABSENT: distended, 

guarding, mass, organolmegaly, rebound, tenderness


Neurological exam: PRESENT: alert, awake, oriented to person, oriented to place,

oriented to time, oriented to situation


Psychiatric exam: PRESENT: flat affect, normal mood


Skin exam: PRESENT: dry, intact, warm





Results


Laboratory Results: 


                                        





                                 08/21/20 06:14 





                                 08/21/20 06:14 





                                        











  08/21/20 08/21/20





  06:14 06:14


 


WBC  13.8 H 


 


RBC  3.77 


 


Hgb  10.4 L 


 


Hct  31.0 L 


 


MCV  82 


 


MCH  27.6 


 


MCHC  33.6 


 


RDW  14.2 H 


 


Plt Count  400 


 


Seg Neutrophils %  77.5 


 


Sodium   140.9


 


Potassium   3.5 L


 


Chloride   106


 


Carbon Dioxide   19 L


 


Anion Gap   16


 


BUN   4 L


 


Creatinine   0.51 L


 


Est GFR (African Amer)   > 60


 


Glucose   207 H


 


Calcium   8.9








                                        





08/19/20 20:14   Catheterized Urine   Urine Culture - Final


                            Escherichia Coli








Impressions: 


                                        





Chest X-Ray  08/19/20 17:17


IMPRESSION:  NO ACUTE RADIOGRAPHIC FINDING IN THE CHEST.


 














Assessment and Plan





- Diagnosis


(1) DKA (diabetic ketoacidoses)


Qualifiers: 


   Diabetes mellitus type: type 1   Diabetes mellitus complication detail: 

without coma   Qualified Code(s): E10.10 - Type 1 diabetes mellitus with 

ketoacidosis without coma   


Is this a current diagnosis for this admission?: Yes   


Plan: 





Due to medication noncompliance and likely UTI


Ketones in urine on UA


Blood sugar in the 400s on admission, started on insulin drip, later stopped 

and was not put on any long acting insulin, later put back on her long-acting 

insulin with improvement in her blood sugars


Sliding scale insulin and Accu-Cheks


A1c 12.5


Beta hydroxybutyrate pending


Poor long-term prognosis if she continues to have medication noncompliance





Improved








(2) UTI (urinary tract infection)


Is this a current diagnosis for this admission?: Yes   


Plan: 





Present on admission


UA showed infection


Urine culture growing multi-sensitive E. coli, can discharge on Macrobid


Treat with ceftriaxone IV


Likely cause/contributor to DKA








(3) Noncompliance


Is this a current diagnosis for this admission?: Yes   





(4) Anxiety


Is this a current diagnosis for this admission?: Yes   





(5) Depression


Qualifiers: 


   Depression Type: unspecified   Qualified Code(s): F32.9 - Major depressive 

disorder, single episode, unspecified   


Is this a current diagnosis for this admission?: Yes   





(6) Gastroparesis due to DM


Is this a current diagnosis for this admission?: Yes   


Plan: 





Presented with nausea and vomiting on admission


Started on IV erythromycin 3 mg/kg IV


Patient reportedly allergic to Reglan


Patient initially refused Protonix, then later agreed to take it








- Plan Summary


Summary: 


Patient will be admitted [facility] where she will receive routine supportive and 

symptomatic cares.  She will be treated with an insulin infusion per protocol 

and will receive high-volume IV fluids utilizing lactated Ringer's solution.  

She will use Ativan 1 mg IV every 4 hours as needed for anxiety or restlessness.

 She will use morphine sulfate 2 to 4 mg IV every 2 hours as needed for pain.  

She will receive a diabetic diet.  CBCs, metabolic profiles and additional 

laboratory and/or radiographic evaluations will be obtained as appropriate.  

Anemia profile will be obtained.  Case management consultation will be obtained 

in order to assist the patient and obtaining continuous glucose monitoring 

devices to allow her to better control her diabetes.





- Time


Time Spent with patient: 25-34 minutes


Medications reviewed and adjusted accordingly: Yes


Anticipated Discharge Disposition: Home, Self Care


Anticipated Discharge Timeframe: within 24 hours





- Inpatient Certification


Based on my medical assessment, after consideration of the patient's 

comorbidities, presenting symptoms, or acuity I expect that the services needed 

warrant INPATIENT care.: Yes


I certify that my determination is in accordance with my understanding of 

Medicare's requirements for reasonable and necessary INPATIENT services [42 CFR 

412.3e].: Yes


Medical Necessity: Significant Comorbidiites Make Outpatient Treatment Too 

Risky, Need Close Monitoring Due to Risk of Patient Decompensation, Need For IV 

Fluids, Need for IV Antibiotics, Risk of Complication if Not Cared For in 

Hospital, Risk of Diagnosis Which Will Require Inpatient Eval/Care/Monitoring

## 2020-08-22 LAB
ADD MANUAL DIFF: (no result)
ANION GAP SERPL CALC-SCNC: 6 MMOL/L (ref 5–19)
ANION GAP SERPL CALC-SCNC: 9 MMOL/L (ref 5–19)
ANISOCYTOSIS BLD QL SMEAR: (no result)
BASOPHILS # BLD AUTO: 0 10^3/UL (ref 0–0.2)
BASOPHILS NFR BLD AUTO: 0.3 % (ref 0–2)
BUN SERPL-MCNC: < 2 MG/DL (ref 7–20)
BUN SERPL-MCNC: < 2 MG/DL (ref 7–20)
CALCIUM: 8.1 MG/DL (ref 8.4–10.2)
CALCIUM: 8.6 MG/DL (ref 8.4–10.2)
CHLORIDE SERPL-SCNC: 103 MMOL/L (ref 98–107)
CHLORIDE SERPL-SCNC: 105 MMOL/L (ref 98–107)
CO2 SERPL-SCNC: 27 MMOL/L (ref 22–30)
CO2 SERPL-SCNC: 28 MMOL/L (ref 22–30)
EOSINOPHIL # BLD AUTO: 0 10^3/UL (ref 0–0.6)
EOSINOPHIL NFR BLD AUTO: 0.2 % (ref 0–6)
ERYTHROCYTE [DISTWIDTH] IN BLOOD BY AUTOMATED COUNT: 13.9 % (ref 11.5–14)
GLUCOSE SERPL-MCNC: 107 MG/DL (ref 75–110)
GLUCOSE SERPL-MCNC: 117 MG/DL (ref 75–110)
HCT VFR BLD CALC: 29.5 % (ref 36–47)
HGB BLD-MCNC: 9.9 G/DL (ref 12–15.5)
LYMPHOCYTES # BLD AUTO: 1.4 10^3/UL (ref 0.5–4.7)
LYMPHOCYTES NFR BLD AUTO: 12.6 % (ref 13–45)
MCH RBC QN AUTO: 27.3 PG (ref 27–33.4)
MCHC RBC AUTO-ENTMCNC: 33.6 G/DL (ref 32–36)
MCV RBC AUTO: 81 FL (ref 80–97)
MONOCYTES # BLD AUTO: 1.2 10^3/UL (ref 0.1–1.4)
MONOCYTES NFR BLD AUTO: 10.5 % (ref 3–13)
NEUTROPHILS # BLD AUTO: 8.5 10^3/UL (ref 1.7–8.2)
NEUTS SEG NFR BLD AUTO: 76.4 % (ref 42–78)
PLATELET # BLD: 416 10^3/UL (ref 150–450)
PLATELET COMMENT: ADEQUATE
POLYCHROMASIA BLD QL SMEAR: (no result)
POTASSIUM SERPL-SCNC: 2.7 MMOL/L (ref 3.6–5)
POTASSIUM SERPL-SCNC: 3.9 MMOL/L (ref 3.6–5)
RBC # BLD AUTO: 3.63 10^6/UL (ref 3.72–5.28)
TOTAL CELLS COUNTED % (AUTO): 100 %
WBC # BLD AUTO: 11.2 10^3/UL (ref 4–10.5)

## 2020-08-22 RX ADMIN — Medication SCH: at 01:28

## 2020-08-22 RX ADMIN — HEPARIN SODIUM SCH: 5000 INJECTION, SOLUTION INTRAVENOUS; SUBCUTANEOUS at 13:18

## 2020-08-22 RX ADMIN — Medication SCH: at 13:18

## 2020-08-22 RX ADMIN — INSULIN LISPRO SCH UNIT: 100 INJECTION, SOLUTION INTRAVENOUS; SUBCUTANEOUS at 16:58

## 2020-08-22 RX ADMIN — ENOXAPARIN SODIUM SCH: 30 INJECTION SUBCUTANEOUS at 17:06

## 2020-08-22 RX ADMIN — INSULIN LISPRO SCH: 100 INJECTION, SOLUTION INTRAVENOUS; SUBCUTANEOUS at 05:10

## 2020-08-22 RX ADMIN — ERYTHROMYCIN LACTOBIONATE SCH MLS/HR: 500 INJECTION, POWDER, LYOPHILIZED, FOR SOLUTION INTRAVENOUS at 22:16

## 2020-08-22 RX ADMIN — POTASSIUM CHLORIDE SCH MLS/HR: 29.8 INJECTION, SOLUTION INTRAVENOUS at 08:24

## 2020-08-22 RX ADMIN — DEXAMETHASONE SODIUM PHOSPHATE PRN MG: 10 INJECTION INTRAMUSCULAR; INTRAVENOUS at 13:22

## 2020-08-22 RX ADMIN — CEFTRIAXONE SCH MLS/HR: 2 INJECTION, SOLUTION INTRAVENOUS at 20:51

## 2020-08-22 RX ADMIN — PANTOPRAZOLE SODIUM SCH MG: 40 INJECTION, POWDER, FOR SOLUTION INTRAVENOUS at 10:18

## 2020-08-22 RX ADMIN — PROMETHAZINE HYDROCHLORIDE PRN MG: 25 INJECTION INTRAMUSCULAR; INTRAVENOUS at 05:15

## 2020-08-22 RX ADMIN — PANTOPRAZOLE SODIUM SCH MG: 40 INJECTION, POWDER, FOR SOLUTION INTRAVENOUS at 21:02

## 2020-08-22 RX ADMIN — SODIUM CHLORIDE, SODIUM LACTATE, POTASSIUM CHLORIDE, AND CALCIUM CHLORIDE PRN MLS/HR: .6; .31; .03; .02 INJECTION, SOLUTION INTRAVENOUS at 15:04

## 2020-08-22 RX ADMIN — INSULIN GLARGINE SCH UNIT: 100 INJECTION, SOLUTION SUBCUTANEOUS at 21:08

## 2020-08-22 RX ADMIN — INSULIN LISPRO SCH: 100 INJECTION, SOLUTION INTRAVENOUS; SUBCUTANEOUS at 01:28

## 2020-08-22 RX ADMIN — INSULIN LISPRO SCH: 100 INJECTION, SOLUTION INTRAVENOUS; SUBCUTANEOUS at 11:37

## 2020-08-22 RX ADMIN — HEPARIN SODIUM SCH: 5000 INJECTION, SOLUTION INTRAVENOUS; SUBCUTANEOUS at 05:14

## 2020-08-22 RX ADMIN — LORAZEPAM PRN MG: 2 INJECTION INTRAMUSCULAR; INTRAVENOUS at 00:42

## 2020-08-22 RX ADMIN — DEXAMETHASONE SODIUM PHOSPHATE PRN MG: 10 INJECTION INTRAMUSCULAR; INTRAVENOUS at 22:59

## 2020-08-22 RX ADMIN — DOCUSATE SODIUM SCH: 100 CAPSULE, LIQUID FILLED ORAL at 10:19

## 2020-08-22 RX ADMIN — POTASSIUM CHLORIDE SCH MLS/HR: 29.8 INJECTION, SOLUTION INTRAVENOUS at 10:18

## 2020-08-22 RX ADMIN — PROMETHAZINE HYDROCHLORIDE PRN MG: 25 INJECTION INTRAMUSCULAR; INTRAVENOUS at 00:42

## 2020-08-22 RX ADMIN — ERYTHROMYCIN LACTOBIONATE SCH MLS/HR: 500 INJECTION, POWDER, LYOPHILIZED, FOR SOLUTION INTRAVENOUS at 16:07

## 2020-08-22 RX ADMIN — DOCUSATE SODIUM SCH: 100 CAPSULE, LIQUID FILLED ORAL at 17:08

## 2020-08-22 RX ADMIN — INSULIN LISPRO SCH: 100 INJECTION, SOLUTION INTRAVENOUS; SUBCUTANEOUS at 07:48

## 2020-08-22 RX ADMIN — INSULIN LISPRO SCH UNIT: 100 INJECTION, SOLUTION INTRAVENOUS; SUBCUTANEOUS at 21:07

## 2020-08-22 RX ADMIN — ERYTHROMYCIN LACTOBIONATE SCH MLS/HR: 500 INJECTION, POWDER, LYOPHILIZED, FOR SOLUTION INTRAVENOUS at 05:15

## 2020-08-22 RX ADMIN — Medication SCH ML: at 21:06

## 2020-08-22 RX ADMIN — POTASSIUM CHLORIDE SCH MLS/HR: 29.8 INJECTION, SOLUTION INTRAVENOUS at 12:23

## 2020-08-22 RX ADMIN — LORAZEPAM PRN MG: 2 INJECTION INTRAMUSCULAR; INTRAVENOUS at 05:15

## 2020-08-22 RX ADMIN — Medication SCH: at 05:14

## 2020-08-22 NOTE — PDOC PROGRESS REPORT
Subjective


Subjective:: 





Patient admitted for DKA with seems to be a recurrent admitting diagnosis for 

her.  Patient reportedly has a history of refusing various aspects of care and 

she continues to do this here today.  During my encounter, she states she is 

refusing to take her Protonix because she "do not want it" when I asked her why 

she refuses to answer me and simply repeats herself.  I offered answer any 

questions or concerns she had about the medication and she only continued to 

repeat herself and became frustrated with me.  I started her on IV erythromycin 

as she states she has an allergy to metoclopramide.  Her A1c is 12.5 and its 

likely she has quite severe gastroparesis as a result of longstanding 

uncontrolled diabetes.  UA showed ketones but there were no beta hydroxybutyrate

or acetone drawn by ED.  I have ordered beta hydroxybutyrate here.  Patient was 

taken off insulin drip on admission however her long-acting insulin was not 

restarted.  Of note, her gap has been rising and her CO2 has been dropping since

this occurred.  I have restarted her long-acting insulin.  She is also getting 

500 cc/h of IV fluids and I have changed this to a more reasonable 125 cc/h.





8/21/2020


Patient is very slightly more talkative today.  She states she continues to 

vomit and is unable to tolerate a diet.  I looked in the trash can sitting next 

to her bed and I did have vomit streaking down the bag.  She states she is 

agreeable to taking the Protonix after I spoke with her about it again today.  

Her vomiting does seem to be getting less and less as her blood sugar is 

controlled, UTI is treated, and she continues to get prokinetic agents here.  

Per nursing, she got a dose of Reglan but this had no adverse effect on the 

patient.  Patient will continue to get erythromycin instead of this due to 

supposed allergy.  Patient does not voice any specific complaints besides 

vomiting and generalized fatigue and weakness.





8/22/2020


I spoke for very long time with the patient today although she refused to answer

me throughout most of the discussion.  I do not believe she is not answering is 

because she is acutely ill and unable to do so, rather I believe she is 

deliberately ignoring us.  Per nursing, patient got into a rather heated 

argument with her boyfriend and kicked him out of her room, yelling loudly.  I 

have changed antiemetics from Phenergan to Zofran.  Erythromycin seems to be 

quite effective along with the Protonix she allowed us to give her yesterday.  

Her vomiting has stopped per nursing today.  I encouraged the patient to at 

least eat some broth and drink some fluids today but she continued to ignore me.

 I asked her if she was depressed and she also refused to answer that question. 

We are repleting her potassium which was quite low this morning.  Her blood 

sugars are doing quite well although she is not eating.  Her UTI will finish 

treatment tomorrow.  Plan on discharging her tomorrow if her labs are 

acceptable.  She refuses to tell me if she has any complaints today.


Reason For Visit: 


DIABETIC KETOACIDOSIS








Physical Exam


Vital Signs: 


                                        











Temp Pulse Resp BP Pulse Ox


 


 98.3 F   105 H  19   147/71 H  100 


 


 08/22/20 12:00  08/22/20 12:00  08/22/20 12:00  08/22/20 12:00  08/22/20 12:00








                                 Intake & Output











 08/21/20 08/22/20 08/23/20





 06:59 06:59 06:59


 


Intake Total 1000 600 100


 


Output Total 1550 300 


 


Balance -550 300 100


 


Weight 46 kg 46 kg 











General appearance: PRESENT: no acute distress, well-developed, well-nourished


Head exam: PRESENT: atraumatic, normocephalic


Eye exam: PRESENT: conjunctiva pink


Mouth exam: PRESENT: moist


Respiratory exam: PRESENT: clear to auscultation jessika.  ABSENT: rales, rhonchi, 

wheezes


Cardiovascular exam: PRESENT: RRR.  ABSENT: diastolic murmur, rubs, systolic 

murmur


GI/Abdominal exam: PRESENT: normal bowel sounds, soft.  ABSENT: distended, 

guarding, mass, organolmegaly, rebound, tenderness


Neurological exam: PRESENT: alert, awake, oriented to person, oriented to place,

oriented to time, oriented to situation, CN II-XII grossly intact.  ABSENT: m

otor sensory deficit


Psychiatric exam: PRESENT: appropriate affect, normal mood


Skin exam: PRESENT: dry, intact, warm





Results


Laboratory Results: 


                                        





                                 08/22/20 06:05 





                                 08/22/20 06:05 





                                        











  08/22/20 08/22/20





  06:05 06:05


 


WBC  11.2 H 


 


RBC  3.63 L 


 


Hgb  9.9 L 


 


Hct  29.5 L 


 


MCV  81 


 


MCH  27.3 


 


MCHC  33.6 


 


RDW  13.9 


 


Plt Count  416 


 


Seg Neutrophils %  76.4 


 


Sodium   138.5


 


Potassium   2.7 L*


 


Chloride   103


 


Carbon Dioxide   27


 


Anion Gap   9


 


BUN   < 2 L


 


Creatinine   0.47 L


 


Est GFR (African Amer)   > 60


 


Glucose   107


 


Calcium   8.6











Impressions: 


                                        





Chest X-Ray  08/19/20 17:17


IMPRESSION:  NO ACUTE RADIOGRAPHIC FINDING IN THE CHEST.


 














Assessment and Plan





- Diagnosis


(1) DKA (diabetic ketoacidoses)


Qualifiers: 


   Diabetes mellitus type: type 1   Diabetes mellitus complication detail: 

without coma   Qualified Code(s): E10.10 - Type 1 diabetes mellitus with 

ketoacidosis without coma   


Is this a current diagnosis for this admission?: Yes   


Plan: 





Due to medication noncompliance and likely UTI


Ketones in urine on UA


Blood sugar in the 400s on admission, started on insulin drip, later stopped 

and was not put on any long acting insulin, later put back on her long-acting 

insulin with improvement in her blood sugars


Sliding scale insulin and Accu-Cheks


A1c 12.5


Beta hydroxybutyrate pending


Poor long-term prognosis if she continues to have medication noncompliance





Improved





Resolved








(2) UTI (urinary tract infection)


Is this a current diagnosis for this admission?: Yes   


Plan: 





Present on admission


UA showed infection


Urine culture growing multi-sensitive E. coli, can discharge on Macrobid


Treat with ceftriaxone IV


Likely cause/contributor to DKA





We will complete treatment 8/23








(3) Noncompliance


Is this a current diagnosis for this admission?: Yes   





(4) Anxiety


Is this a current diagnosis for this admission?: Yes   





(5) Depression


Qualifiers: 


   Depression Type: unspecified   Qualified Code(s): F32.9 - Major depressive 

disorder, single episode, unspecified   


Is this a current diagnosis for this admission?: Yes   





(6) Gastroparesis due to DM


Is this a current diagnosis for this admission?: Yes   


Plan: 





Presented with nausea and vomiting on admission


Started on IV erythromycin 3 mg/kg IV


Patient reportedly allergic to Reglan


Patient initially refused Protonix, then later agreed to take it





With erythromycin very effective along with Protonix








(7) Gastrointestinal tube present


Is this a current diagnosis for this admission?: Yes   


Plan: 





Patient refuses to let staff examine her G-tube or to give her any nutrition 

through it, became angry when this was suggested








(8) Hypokalemia


Is this a current diagnosis for this admission?: Yes   


Plan: 


Repleting








- Plan Summary


Summary: 


Patient will be admitted [facility] where she will receive routine supportive and 

symptomatic cares.  She will be treated with an insulin infusion per protocol 

and will receive high-volume IV fluids utilizing lactated Ringer's solution.  

She will use Ativan 1 mg IV every 4 hours as needed for anxiety or restlessness.

 She will use morphine sulfate 2 to 4 mg IV every 2 hours as needed for pain.  

She will receive a diabetic diet.  CBCs, metabolic profiles and additional 

laboratory and/or radiographic evaluations will be obtained as appropriate.  

Anemia profile will be obtained.  Case management consultation will be obtained 

in order to assist the patient and obtaining continuous glucose monitoring 

devices to allow her to better control her diabetes.





- Time


Time Spent with patient: 25-34 minutes


Medications reviewed and adjusted accordingly: Yes


Anticipated Discharge Disposition: Home, Self Care


Anticipated Discharge Timeframe: within 24 hours





- Inpatient Certification


Based on my medical assessment, after consideration of the patient's 

comorbidities, presenting symptoms, or acuity I expect that the services needed 

warrant INPATIENT care.: Yes


I certify that my determination is in accordance with my understanding of 

Medicare's requirements for reasonable and necessary INPATIENT services [42 CFR 

412.3e].: Yes


Medical Necessity: Significant Comorbidiites Make Outpatient Treatment Too 

Risky, Need Close Monitoring Due to Risk of Patient Decompensation, Need For IV 

Fluids, Risk of Complication if Not Cared For in Hospital, Risk of Diagnosis 

Which Will Require Inpatient Eval/Care/Monitoring

## 2020-08-23 VITALS — SYSTOLIC BLOOD PRESSURE: 113 MMHG | DIASTOLIC BLOOD PRESSURE: 66 MMHG

## 2020-08-23 RX ADMIN — DOCUSATE SODIUM SCH: 100 CAPSULE, LIQUID FILLED ORAL at 10:21

## 2020-08-23 RX ADMIN — DOCUSATE SODIUM SCH: 100 CAPSULE, LIQUID FILLED ORAL at 17:08

## 2020-08-23 RX ADMIN — INSULIN LISPRO SCH: 100 INJECTION, SOLUTION INTRAVENOUS; SUBCUTANEOUS at 17:07

## 2020-08-23 RX ADMIN — ERYTHROMYCIN LACTOBIONATE SCH MLS/HR: 500 INJECTION, POWDER, LYOPHILIZED, FOR SOLUTION INTRAVENOUS at 05:12

## 2020-08-23 RX ADMIN — ENOXAPARIN SODIUM SCH: 30 INJECTION SUBCUTANEOUS at 10:21

## 2020-08-23 RX ADMIN — INSULIN LISPRO SCH: 100 INJECTION, SOLUTION INTRAVENOUS; SUBCUTANEOUS at 00:11

## 2020-08-23 RX ADMIN — INSULIN LISPRO SCH: 100 INJECTION, SOLUTION INTRAVENOUS; SUBCUTANEOUS at 13:52

## 2020-08-23 RX ADMIN — CEFTRIAXONE SCH MLS/HR: 2 INJECTION, SOLUTION INTRAVENOUS at 10:27

## 2020-08-23 RX ADMIN — Medication SCH ML: at 05:17

## 2020-08-23 RX ADMIN — PANTOPRAZOLE SODIUM SCH MG: 40 INJECTION, POWDER, FOR SOLUTION INTRAVENOUS at 10:26

## 2020-08-23 RX ADMIN — Medication SCH: at 14:21

## 2020-08-23 RX ADMIN — INSULIN LISPRO SCH: 100 INJECTION, SOLUTION INTRAVENOUS; SUBCUTANEOUS at 04:26

## 2020-08-23 RX ADMIN — INSULIN LISPRO SCH: 100 INJECTION, SOLUTION INTRAVENOUS; SUBCUTANEOUS at 07:59

## 2020-08-23 NOTE — PDOC DISCHARGE SUMMARY
Impression





- Admit/DC Date/PCP


Admission Date/Primary Care Provider: 


  08/19/20 21:46





  





Discharge Date: 08/23/20





- Discharge Diagnosis


(1) DKA (diabetic ketoacidoses)


Is this a current diagnosis for this admission?: Yes   





(2) UTI (urinary tract infection)


Is this a current diagnosis for this admission?: Yes   





(3) Noncompliance


Is this a current diagnosis for this admission?: Yes   





(4) Anxiety


Is this a current diagnosis for this admission?: Yes   





(5) Depression


Is this a current diagnosis for this admission?: Yes   





(6) Gastroparesis due to DM


Is this a current diagnosis for this admission?: Yes   





(7) Gastrointestinal tube present


Is this a current diagnosis for this admission?: Yes   





(8) Hypokalemia


Is this a current diagnosis for this admission?: Yes   





- Assessment


Summary: 


Patient will be admitted [facility] where she will receive routine supportive and 

symptomatic cares.  She will be treated with an insulin infusion per protocol 

and will receive high-volume IV fluids utilizing lactated Ringer's solution.  

She will use Ativan 1 mg IV every 4 hours as needed for anxiety or restlessness.

 She will use morphine sulfate 2 to 4 mg IV every 2 hours as needed for pain.  

She will receive a diabetic diet.  CBCs, metabolic profiles and additional 

laboratory and/or radiographic evaluations will be obtained as appropriate.  

Anemia profile will be obtained.  Case management consultation will be obtained 

in order to assist the patient and obtaining continuous glucose monitoring 

devices to allow her to better control her diabetes.





- Additional Information


Resuscitation Status: Full Code


Discharge Diet: As Tolerated, Diabetic


Discharge Activity: Activity As Tolerated, Balance Activity w/Rest


Prescriptions: 


Erythromycin Base [Erythrocin Base 250 mg Tablet] 250 mg PO AC #90 tablet


Omeprazole 20 mg PO QAM #30 capsule.


Mirtazapine [Remeron 15 mg Tablet] 15 mg PO QHS #30 tablet


Home Medications: 








Insulin Aspart [Novolog Flexpen] 10 unit SUBCUT .SLD SCALE 08/19/20 


Insulin Glargine,Hum.rec.anlog [Lantus Insulin 100 Unit/mL Insulin Pen] 25 unit 

SUBCUT QHS 08/19/20 


Erythromycin Base [Erythrocin Base 250 mg Tablet] 250 mg PO AC #90 tablet 08 /23/20 


Mirtazapine [Remeron 15 mg Tablet] 15 mg PO QHS #30 tablet 08/23/20 


Omeprazole 20 mg PO [facility] #30 capsule. 08/23/20 











History of Present Illiness


History of Present Illness: 


Per Admitting Physician:


"AMY PACHECO is a 25 year old female who presented to the emergency room 

with a one-week history of hyperglycemic symptoms.  She admits running out of 

her continuous glucose monitoring sensors 2 weeks ago and over the last week she

has been experiencing symptoms of hyperglycemia including generalized weakness, 

nausea and malaise.  She admits accompanying poor oral intake and associated 

constipation.  She denies other associated or accompanying signs and symptoms.  

She admits prior similar episodes with DKA.  She has made efforts at controlling

her sugar by continuing her Lantus insulin but without glucose monitoring she 

has been unable to take her NovoLog.  She denies identification of any 

additional aggravating or ameliorating factors for her hyperglycemic symptoms.  

In the emergency room she was noted to have a glucose greater than 500 and a 

mild acidosis, but she did not show dramatic clinical improvement with IV fluids

and insulin.  She was subsequently admitted to the hospital for further 

evaluation and treatment."








Hospital Course


Hospital Course: 


Patient admitted for DKA with seems to be a recurrent admitting diagnosis for 

her.  Patient reportedly has a history of refusing various aspects of care and 

she continues to do this here today.  During my encounter, she states she is 

refusing to take her Protonix because she "do not want it" when I asked her why 

she refuses to answer me and simply repeats herself.  I offered answer any 

questions or concerns she had about the medication and she only continued to 

repeat herself and became frustrated with me.  I started her on IV erythromycin 

as she states she has an allergy to metoclopramide.  Her A1c is 12.5 and its 

likely she has quite severe gastroparesis as a result of longstanding 

uncontrolled diabetes.  UA showed ketones but there were no beta hydroxybutyrate

or acetone drawn by ED.  I have ordered beta hydroxybutyrate here.  Patient was 

taken off insulin drip on admission however her long-acting insulin was not 

restarted.  Of note, her gap has been rising and her CO2 has been dropping since

this occurred.  I have restarted her long-acting insulin.  She is also getting 

500 cc/h of IV fluids and I have changed this to a more reasonable 125 cc/h.





8/21/2020


Patient is very slightly more talkative today.  She states she continues to 

vomit and is unable to tolerate a diet.  I looked in the trash can sitting next 

to her bed and I did have vomit streaking down the bag.  She states she is 

agreeable to taking the Protonix after I spoke with her about it again today.  

Her vomiting does seem to be getting less and less as her blood sugar is 

controlled, UTI is treated, and she continues to get prokinetic agents here.  

Per nursing, she got a dose of Reglan but this had no adverse effect on the 

patient.  Patient will continue to get erythromycin instead of this due to 

supposed allergy.  Patient does not voice any specific complaints besides 

vomiting and generalized fatigue and weakness.





8/22/2020


I spoke for very long time with the patient today although she refused to answer

me throughout most of the discussion.  I do not believe she is not answering is 

because she is acutely ill and unable to do so, rather I believe she is 

deliberately ignoring us.  Per nursing, patient got into a rather heated argume

nt with her boyfriend and kicked him out of her room, yelling loudly.  I have 

changed antiemetics from Phenergan to Zofran.  Erythromycin seems to be quite 

effective along with the Protonix she allowed us to give her yesterday.  Her 

vomiting has stopped per nursing today.  I encouraged the patient to at least 

eat some broth and drink some fluids today but she continued to ignore me.  I 

asked her if she was depressed and she also refused to answer that question.  We

are repleting her potassium which was quite low this morning.  Her blood sugars 

are doing quite well although she is not eating.  Her UTI will finish treatment 

tomorrow.  Plan on discharging her tomorrow if her labs are acceptable.  She 

refuses to tell me if she has any complaints today.





On day of discharge, patient is much more awake and alert and talkative.  We had

a very long discussion about her diabetes and she states she is currently in the

process of getting an insulin pump from her endocrinologist.  She has a contin

uous glucose monitor at home but she states she was not shipped the supplies in 

a timely fashion and this is a large contributing factor as to why she ended up 

being admitted here because she could not monitor her blood sugar at home.  She 

states her supplies have since been delivered while she has been here.  Her UTI 

has been successfully treated with 4 days of IV ceftriaxone.  She will resume he

r home dosing of insulin with her continuous glucose monitor and use.  I also 

spoke with her about her anxiety and depression and recommended that she see a 

psychiatrist.  Patient agreed and we also discussed starting her on Remeron 

which will help with her appetite, sleep, and depression.





























(1) DKA (diabetic ketoacidoses)


Qualifiers: 


   Diabetes mellitus type: type 1   Diabetes mellitus complication detail: 

without coma   Qualified Code(s): E10.10 - Type 1 diabetes mellitus with 

ketoacidosis without coma   


Is this a current diagnosis for this admission?: Yes   


Plan: 





Due to medication noncompliance and likely UTI


Ketones in urine on UA


Blood sugar in the 400s on admission, started on insulin drip, later stopped 

and was not put on any long acting insulin, later put back on her long-acting 

insulin with improvement in her blood sugars


Sliding scale insulin and Accu-Cheks


A1c 12.5


Beta hydroxybutyrate pending


Poor long-term prognosis if she continues to have medication noncompliance





Improved





Resolved








(2) UTI (urinary tract infection)


Is this a current diagnosis for this admission?: Yes   


Plan: 





Present on admission


UA showed infection


Urine culture growing multi-sensitive E. coli, can discharge on Macrobid


Treat with ceftriaxone IV


Likely cause/contributor to DKA





Completed treatment, resolved








(3) Noncompliance


Is this a current diagnosis for this admission?: Yes   





(4) Anxiety


Is this a current diagnosis for this admission?: Yes   





(5) Depression


Qualifiers: 


   Depression Type: unspecified   Qualified Code(s): F32.9 - Major depressive 

disorder, single episode, unspecified   


Is this a current diagnosis for this admission?: Yes   





Started on Remeron which she has taken a few years ago, tolerating well, patient

understands this must be monitored by her PCP and/or psychiatrist





(6) Gastroparesis due to DM


Is this a current diagnosis for this admission?: Yes   


Plan: 





Presented with nausea and vomiting on admission


Started on IV erythromycin 3 mg/kg IV


Patient reportedly allergic to Reglan


Patient initially refused Protonix, then later agreed to take it





Erythromycin very effective along with Protonix; transitioned her to oral 

erythromycin 3 times daily AC along with daily omeprazole.  Erythromycin p.o. 

should only be used for up to 4 weeks or tachyphylaxis will cause it to become 

less and less effective.  She needs to follow-up with GI.








(7) Gastrointestinal tube present


Is this a current diagnosis for this admission?: Yes   


Plan: 





Patient refuses to let staff examine her G-tube or to give her any nutrition 

through it








(8) Hypokalemia


Is this a current diagnosis for this admission?: Yes   


Plan: 


Repleting





Physical Exam


Vital Signs: 


                                        











Temp Pulse Resp BP Pulse Ox


 


 98.0 F   93   14   108/68   98 


 


 08/23/20 16:04  08/23/20 16:04  08/23/20 16:04  08/23/20 16:04  08/23/20 16:04








                                 Intake & Output











 08/22/20 08/23/20 08/24/20





 06:59 06:59 06:59


 


Intake Total 1600 300 310


 


Output Total 300  


 


Balance 1300 300 310


 


Weight 46 kg 46 kg 











General appearance: PRESENT: no acute distress, well-developed, well-nourished


Head exam: PRESENT: atraumatic, normocephalic


Eye exam: PRESENT: conjunctiva pink


Mouth exam: PRESENT: moist


Respiratory exam: PRESENT: clear to auscultation jessika.  ABSENT: rales, rhonchi, 

wheezes


Cardiovascular exam: PRESENT: RRR.  ABSENT: diastolic murmur, rubs, systolic 

murmur


GI/Abdominal exam: PRESENT: normal bowel sounds, soft.  ABSENT: distended, 

guarding, mass, organolmegaly, rebound, tenderness


Neurological exam: PRESENT: alert, awake, oriented to person, oriented to place,

oriented to time, oriented to situation


Psychiatric exam: PRESENT: appropriate affect, normal mood


Skin exam: PRESENT: dry, intact, warm





Results


Laboratory Results: 


                                        











WBC  11.2 10^3/uL (4.0-10.5)  H  08/22/20  06:05    


 


RBC  3.63 10^6/uL (3.72-5.28)  L  08/22/20  06:05    


 


Hgb  9.9 g/dL (12.0-15.5)  L  08/22/20  06:05    


 


Hct  29.5 % (36.0-47.0)  L  08/22/20  06:05    


 


MCV  81 fl (80-97)   08/22/20  06:05    


 


MCH  27.3 pg (27.0-33.4)   08/22/20  06:05    


 


MCHC  33.6 g/dL (32.0-36.0)   08/22/20  06:05    


 


RDW  13.9 % (11.5-14.0)   08/22/20  06:05    


 


Plt Count  416 10^3/uL (150-450)   08/22/20  06:05    


 


Lymph % (Auto)  12.6 % (13-45)  L  08/22/20  06:05    


 


Mono % (Auto)  10.5 % (3-13)   08/22/20  06:05    


 


Eos % (Auto)  0.2 % (0-6)   08/22/20  06:05    


 


Baso % (Auto)  0.3 % (0-2)   08/22/20  06:05    


 


Absolute Neuts (auto)  8.5 10^3/uL (1.7-8.2)  H  08/22/20  06:05    


 


Absolute Lymphs (auto)  1.4 10^3/uL (0.5-4.7)   08/22/20  06:05    


 


Absolute Monos (auto)  1.2 10^3/uL (0.1-1.4)   08/22/20  06:05    


 


Absolute Eos (auto)  0.0 10^3/uL (0.0-0.6)   08/22/20  06:05    


 


Absolute Basos (auto)  0.0 10^3/uL (0.0-0.2)   08/22/20  06:05    


 


Seg Neutrophils %  76.4 % (42-78)   08/22/20  06:05    


 


Platelet Comment  ADEQUATE   08/22/20  06:05    


 


Polychromasia  1+   08/22/20  06:05    


 


Anisocytosis  1+   08/22/20  06:05    


 


Sodium  138.9 mmol/L (137-145)   08/22/20  14:15    


 


Potassium  3.9 mmol/L (3.6-5.0)  D 08/22/20  14:15    


 


Chloride  105 mmol/L ()   08/22/20  14:15    


 


Carbon Dioxide  28 mmol/L (22-30)   08/22/20  14:15    


 


Anion Gap  6  (5-19)   08/22/20  14:15    


 


BUN  < 2 mg/dL (7-20)  L  08/22/20  14:15    


 


Creatinine  0.45 mg/dL (0.52-1.25)  L  08/22/20  14:15    


 


Est GFR ( Amer)  > 60  (>60)   08/22/20  14:15    


 


Est GFR (MDRD) Non-Af  > 60  (>60)   08/22/20  14:15    


 


Glucose  117 mg/dL ()  H  08/22/20  14:15    


 


POC Glucose  143 mg/dL ()  H  08/23/20  16:05    


 


Hemoglobin A1c %  12.5 % (4.7-6.0)  H  08/20/20  05:46    


 


Calcium  8.1 mg/dL (8.4-10.2)  L  08/22/20  14:15    


 


Magnesium  1.7 mg/dL (1.6-2.3)   08/20/20  05:46    


 


Total Bilirubin  0.5 mg/dL (0.2-1.3)   08/19/20  17:30    


 


Direct Bilirubin  0.2 mg/dL (0.0-0.4)   08/19/20  17:30    


 


Neonat Total Bilirubin  Not Reportable   08/19/20  17:30    


 


Neonat Direct Bilirubin  Not Reportable   08/19/20  17:30    


 


Neonat Indirect Bili  Not Reportable   08/19/20  17:30    


 


AST  26 U/L (14-36)   08/19/20  17:30    


 


ALT  8 U/L (<35)   08/19/20  17:30    


 


Alkaline Phosphatase  147 U/L ()  H  08/19/20  17:30    


 


Total Protein  9.1 g/dL (6.3-8.2)  H  08/19/20  17:30    


 


Albumin  3.8 g/dL (3.5-5.0)   08/19/20  17:30    


 


Serum HCG, Qual  NEGATIVE  (NEGATIVE)   08/19/20  17:30    


 


Urine Color  YELLOW   08/19/20  20:14    


 


Urine Appearance  SLIGHTLY-CLOUDY   08/19/20  20:14    


 


Urine pH  6.0  (5.0-9.0)   08/19/20  20:14    


 


Ur Specific Gravity  1.018   08/19/20  20:14    


 


Urine Protein  100 mg/dL (NEGATIVE)  H  08/19/20  20:14    


 


Urine Glucose (UA)  >=500 mg/dL (NEGATIVE)  H  08/19/20  20:14    


 


Urine Ketones  80 mg/dL (NEGATIVE)  H  08/19/20  20:14    


 


Urine Blood  LARGE  (NEGATIVE)  H  08/19/20  20:14    


 


Urine Nitrite  NEGATIVE  (NEGATIVE)   08/19/20  20:14    


 


Urine Bilirubin  NEGATIVE  (NEGATIVE)   08/19/20  20:14    


 


Urine Urobilinogen  NEGATIVE mg/dL (<2.0)   08/19/20  20:14    


 


Ur Leukocyte Esterase  SMALL  (NEGATIVE)  H  08/19/20  20:14    


 


Urine WBC (Auto)  37 /HPF  08/19/20  20:14    


 


Urine RBC (Auto)  5 /HPF  08/19/20  20:14    


 


Urine Bacteria (Auto)  TRACE /HPF  08/19/20  20:14    


 


Squamous Epi Cells Auto  5 /HPF  08/19/20  20:14    


 


Urine Mucus (Auto)  RARE /LPF  08/19/20  20:14    


 


Urine Ascorbic Acid  NEGATIVE  (NEGATIVE)   08/19/20  20:14    











Impressions: 


                                        





Chest X-Ray  08/19/20 17:17


IMPRESSION:  NO ACUTE RADIOGRAPHIC FINDING IN THE CHEST.


 














Plan


Plan of Treatment: 


Follow PCP


Follow-up with endocrinology


Follow-up with psychiatry


Follow-up with GI


Time Spent: Greater than 30 Minutes





Stroke


Is this a Stroke Patient?: No





Acute Heart Failure





- **


Is this a Heart Failure Patient?: No